# Patient Record
Sex: MALE | Race: OTHER | HISPANIC OR LATINO
[De-identification: names, ages, dates, MRNs, and addresses within clinical notes are randomized per-mention and may not be internally consistent; named-entity substitution may affect disease eponyms.]

---

## 2019-01-30 PROBLEM — Z00.00 ENCOUNTER FOR PREVENTIVE HEALTH EXAMINATION: Status: ACTIVE | Noted: 2019-01-30

## 2019-02-27 ENCOUNTER — APPOINTMENT (OUTPATIENT)
Dept: ORTHOPEDIC SURGERY | Facility: CLINIC | Age: 59
End: 2019-02-27
Payer: COMMERCIAL

## 2019-02-27 VITALS
SYSTOLIC BLOOD PRESSURE: 168 MMHG | BODY MASS INDEX: 33.65 KG/M2 | HEIGHT: 68 IN | TEMPERATURE: 98.1 F | HEART RATE: 68 BPM | WEIGHT: 222 LBS | DIASTOLIC BLOOD PRESSURE: 97 MMHG

## 2019-02-27 PROCEDURE — 73564 X-RAY EXAM KNEE 4 OR MORE: CPT | Mod: LT

## 2019-02-27 PROCEDURE — 20610 DRAIN/INJ JOINT/BURSA W/O US: CPT | Mod: LT

## 2019-02-27 PROCEDURE — 99204 OFFICE O/P NEW MOD 45 MIN: CPT | Mod: 25

## 2019-02-27 RX ORDER — METHYLPRED ACET/NACL,ISO-OS/PF 40 MG/ML
40 VIAL (ML) INJECTION
Qty: 1 | Refills: 0 | Status: ACTIVE | COMMUNITY
Start: 2019-02-27

## 2019-02-27 RX ORDER — METHOCARBAMOL 750 MG/1
750 TABLET, FILM COATED ORAL
Qty: 20 | Refills: 0 | Status: ACTIVE | COMMUNITY
Start: 2018-11-16

## 2019-02-27 RX ORDER — IBUPROFEN 600 MG/1
600 TABLET, FILM COATED ORAL
Qty: 20 | Refills: 0 | Status: ACTIVE | COMMUNITY
Start: 2018-11-16

## 2019-02-27 NOTE — PROCEDURE
[de-identified] : The bilateral knees were prepped with Betadine and under sterile condition the 40 mg Depo-Medrol and then 5 cc Lidocaine and 20 cc Marcaine injection was performed with a 21 gauge needle. The needle was introduced into the joint, aspiration was performed to ensure intra-articular placement and the medication was injected. Upon withdrawal of the needle the site was cleaned with alcohol and a band aid applied. The patient tolerated the injection well and there were no adverse effects. Post injection instructions included no strenuous activity for 24 hours, cryotherapy and if there are any adverse effects to contact the office.

## 2019-02-27 NOTE — ADDENDUM
[FreeTextEntry1] : I, Kirby Owens, acted solely as a scribe for Dr. Jacob Schulte on 02/27/2019.\par \par All medical record entries made by the scribe were at my, Dr. Jacob Schulte, direction and personally dictated by me on 02/27/2019. I have reviewed the chart and agree that the record accurately reflects my personal performance of the history, physical exam, assessment and plan. I have also personally directed, reviewed, and agreed with the chart.

## 2019-02-27 NOTE — DISCUSSION/SUMMARY
[de-identified] : 59 year old male presents with severe bilateral knee DJD. He has essentially not had conservative treatment thus far. Discussed at length the natural history of degenerative arthritis and reviewed non-operative and operative treatment. At this point I suggested physiotherapy, NSAIDs and/or Tylenol, and corticosteroid injections. A prescription for Physical Therapy was provided. He elected to receive an injection today. We talked about an injection. Discussed at length with the patient the planned steroid and lidocaine injection. The risks, benefits, convalescence and alternatives were reviewed. The possible side effects discussed included but were not limited to: pain, swelling, heat and redness. There symptoms are generally mild but if they are extensive then contact the office. Giving pain relievers by mouth such as NSAIDs or Tylenol can generally treat the reactions to steroid and lidocaine. Rare cases of infection have been noted. Rash, hives and itching may occur post injection. If you have muscle pain or cramps, flushing and or swelling of the face, rapid heartbeat, nausea, dizziness, fever, chills, headache, difficulty breathing, swelling in the arms or legs, or have a prickly feeling of your skin, contact a health care provider immediately. If he fails to improve with conservative treatment, will consider knee replacement. FU in 6 weeks with Dr. Groves.

## 2019-02-27 NOTE — HISTORY OF PRESENT ILLNESS
[Pain Location] : pain [de-identified] : Patient is a 59 year old male who presents c/o left knee pain x years. patient states pain started after soccer injury, had SA 8 years ago. pain increased 2 years ago. patient states pain is a diffuse ache, no radiation of pain. positive buckling, clicking and swelling, no locking.  patient had PT after SA. patient c/o mild right knee pain as well

## 2019-02-27 NOTE — PHYSICAL EXAM
[Normal] : Gait: normal [LE] : Sensory: Intact in bilateral lower extremities [DP] : dorsalis pedis 2+ and symmetric bilaterally [PT] : posterior tibial 2+ and symmetric bilaterally [Obese] : obese [Poor Appearance] : well-appearing [Acute Distress] : not in acute distress [de-identified] : General appearance: Well nourished, well developed, pleasant, alert, and oriented x3.\par Respiratory: Breathing not labored, in no acute distress.\par HEENT: Normocephalic. EOM intact. Sclerae are clear.\par CV: No apparent abnormalities. No lower leg edema. No varicosities. Pedal pulses are palpable.\par Neurologic: Sensation is intact to light touch in the upper and lower extremities. No muscle weakness.\par Dermatologic: No apparent skin lesions or rash.\par Spine: C spine and L spine appear normal and move freely, normal and nontender.\par Upper Extremities: Hands, wrists, and elbows are normal and move freely. Shoulders are normal and move freely. All range of motion is symmetrical.\par Normal body habitus. Pulses are palpable.\par Review of systems, please see form for complete details. Medical data sheet was reviewed.\par \par Left knee, FROM hip, mild effusion, 0 - 120, no crepitus, no medial pain, no lateral pain, no Lachman, no pivot shift, no anterior drawer, no posterior drawer, stable, varus alignment- greater than right, significant bilateral bowing noted\par Right knee, FROM hip, mild effusion, 0 - 120, large crepitus, no medial pain, no lateral pain, no Lachman, no pivot shift, no anterior drawer, no posterior drawer, stable, varus alignment, significant bilateral bowing noted\par   [de-identified] : Xrays, taken at the office today show:\par \par Right Knee xrays:\par Standing AP, Lateral, and Merchant films:\par DJD, severe Grade 4 changes present, Sclerosis present, Osteophytes seen, varus alignment, severe patellofemoral arthritis \par \par Left Knee xrays:\par Standing AP, Lateral, and Merchant films:\par DJD, severe Grade 4 changes present, Sclerosis present, Osteophytes seen, varus alignment, severe patellofemoral arthritis

## 2019-05-01 DIAGNOSIS — I10 ESSENTIAL (PRIMARY) HYPERTENSION: ICD-10-CM

## 2019-05-01 RX ORDER — METFORMIN ER 500 MG 500 MG/1
500 TABLET ORAL
Refills: 0 | Status: ACTIVE | COMMUNITY
Start: 2019-01-18

## 2019-05-01 RX ORDER — LOSARTAN POTASSIUM 50 MG/1
50 TABLET, FILM COATED ORAL
Refills: 0 | Status: ACTIVE | COMMUNITY
Start: 2019-01-18

## 2019-05-02 ENCOUNTER — APPOINTMENT (OUTPATIENT)
Dept: CARDIOLOGY | Facility: CLINIC | Age: 59
End: 2019-05-02

## 2019-05-20 ENCOUNTER — APPOINTMENT (OUTPATIENT)
Dept: ORTHOPEDIC SURGERY | Facility: CLINIC | Age: 59
End: 2019-05-20

## 2021-05-24 ENCOUNTER — APPOINTMENT (OUTPATIENT)
Dept: ORTHOPEDIC SURGERY | Facility: CLINIC | Age: 61
End: 2021-05-24
Payer: COMMERCIAL

## 2021-05-24 VITALS
HEIGHT: 69 IN | SYSTOLIC BLOOD PRESSURE: 160 MMHG | TEMPERATURE: 97.9 F | DIASTOLIC BLOOD PRESSURE: 94 MMHG | BODY MASS INDEX: 33.03 KG/M2 | HEART RATE: 64 BPM | WEIGHT: 223 LBS

## 2021-05-24 DIAGNOSIS — Z83.3 FAMILY HISTORY OF DIABETES MELLITUS: ICD-10-CM

## 2021-05-24 DIAGNOSIS — M21.162 VARUS DEFORMITY, NOT ELSEWHERE CLASSIFIED, LEFT KNEE: ICD-10-CM

## 2021-05-24 DIAGNOSIS — M25.562 PAIN IN RIGHT KNEE: ICD-10-CM

## 2021-05-24 DIAGNOSIS — M25.561 PAIN IN RIGHT KNEE: ICD-10-CM

## 2021-05-24 DIAGNOSIS — Z86.39 PERSONAL HISTORY OF OTHER ENDOCRINE, NUTRITIONAL AND METABOLIC DISEASE: ICD-10-CM

## 2021-05-24 DIAGNOSIS — M17.0 BILATERAL PRIMARY OSTEOARTHRITIS OF KNEE: ICD-10-CM

## 2021-05-24 PROCEDURE — 99072 ADDL SUPL MATRL&STAF TM PHE: CPT

## 2021-05-24 PROCEDURE — 73562 X-RAY EXAM OF KNEE 3: CPT | Mod: RT

## 2021-05-24 PROCEDURE — 99204 OFFICE O/P NEW MOD 45 MIN: CPT

## 2021-05-24 NOTE — END OF VISIT
[FreeTextEntry3] : I, Ranjith Fuller, acted solely as a scribe for Dr. Joshua Groves on this date 05/24/2021.

## 2021-05-24 NOTE — HISTORY OF PRESENT ILLNESS
[Pain Location] : pain [6] : a current pain level of 6/10 [3] : a minimum pain level of 3/10 [8] : a maximum pain level of 8/10 [Walking] : worsened by walking [Rest] : relieved by rest [de-identified] : 62 y/o M presents with b/l knee pain where the left knee pain is worse than the right knee pain. This pain has been present for a couple of years. HIs pain is in the medial aspect of his knees. The pt sometimes kneels while at work which causes his pain. He has no hip pain. His knee howard and clicking. He denies locking. He received cortisone injections from Dr. Schulte in 2019. He describes his pain as an ache.

## 2021-05-24 NOTE — REASON FOR VISIT
[Initial Visit] : an initial visit for [Knee Pain] : knee pain [FreeTextEntry1] : [id number] [FreeTextEntry2] : Anatoly [TWNoteComboBox1] : [place]

## 2021-05-24 NOTE — DISCUSSION/SUMMARY
[Surgical risks reviewed] : Surgical risks reviewed [de-identified] : 62 y/o M with bone on bone medial compartmental osteoarthritis with marked varus deformity (L>R) of his bilateral knees. Conservative therapy and surgical options discussed in detail with the patient. The patient is a candidate for a staged bilateral TKA. He is experiencing more pain in the left knee than in the right knee. As a result, he would like to have the left TKA first--particularly at the end of November or beginning of December. We discussed pre-op, surgery, and post-op in detail. He is aware and understands the risk of infections. He scheduled the left TKA today. \par \par The patient is a 61 year year individual with end stage arthritis of their b/l knee joint. Based upon the patient's continued symptoms and failure to respond to conservative treatment I have recommended a left total knee arthroplasty for this patient. A long discussion took place with the patient describing what a total joint replacement is and what the procedure would entail. A total knee arthroplasty model, similar to the implant that was used during the operation, was utilized to demonstrate and to discuss the various bearing surfaces of the implants. The hospitalization and post-operative care and rehabilitation were also discussed. The use of perioperative antibiotics and DVT prophylaxis were discussed. The risk, benefits and alternatives to a surgical intervention were discussed at length with the patient. The patient was also advised of risks related to the medical comorbidities, elevated body mass index (BMI), and smoking where applicable. We discussed how to reduce modifiable risk factors and encouraged smoking cessation were applicable.. A lengthy discussion took place to review the most common complications including but not limited to: deep vein thrombosis, pulmonary embolus, heart attack, stroke, infection, wound breakdown, numbness, damage to nerves, tendon, muscles, arteries or other blood vessels, death and other possible complications from anesthesia. The patient was told that we will take steps to minimize these risks by using sterile technique, antibiotics and DVT prophylaxis when appropriate and follow the patient postoperatively in the office setting to monitor progress. The possibility of recurrent pain, no improvement in pain and actual worsening of pain were also discussed with the patient.\par The discharge plan of care focused on the patient going home following surgery. The patient was encouraged to make the necessary arrangements to have someone stay with them when they are discharged home. Following discharge, a home care nurse was to the patient. The home care nurse would open the patient’s home care case and request home physical therapy services. Home physical therapy was to commence following discharge provided it was appropriate and covered by the health insurance benefit plan. \par The benefits of surgery were discussed with the patient including the potential for improving her current clinical condition through operative intervention. Alternatives to surgical intervention including continued conservative management were also discussed in detail. All questions were answered to the satisfaction of the patient. The treatment plan of care, as well as a model of a total knee arthroplasty equivalent to the one that will be used for their total joint replacement, was shared with the patient. The patient agreed to the plan of care as well as the use of implants in their total joint replacement.

## 2021-05-24 NOTE — PHYSICAL EXAM
[LE] : Sensory: Intact in bilateral lower extremities [ALL] : dorsalis pedis, posterior tibial, femoral, popliteal, and radial 2+ and symmetric bilaterally [Normal] : Alert and in no acute distress [Poor Appearance] : well-appearing [de-identified] : GENERAL APPEARANCE: Well nourished and hydrated, pleasant, alert, and oriented x 3. Appears their stated age. \par HEENT: Normocephalic, extraocular eye motion intact. Nasal septum midline. Oral cavity clear. External auditory canal clear. \par RESPIRATORY: Breath sounds clear and audible in all lobes. No wheezing, No accessory muscle use.\par CARDIOVASCULAR: No apparent abnormalities. No lower leg edema. No varicosities. Pedal pulses are palpable.\par NEUROLOGIC: Sensation is normal, no muscle weakness in the upper or lower extremities.\par DERMATOLOGIC: No apparent skin lesions, moist, warm, no rash.\par SPINE: Cervical spine appears normal and moves freely; thoracic spine appears normal and moves freely; lumbosacral spine appears normal and moves freely, normal, nontender.\par MUSCULOSKELETAL: Hands, wrists, and elbows are normal and move freely, shoulders are normal and move freely.  [de-identified] : Bilateral knee exam shows medial joint line tenderness, marked varus deformity (L>R)\par Left knee exam shows ROM 0-150 degrees\par Right knee exam shows ROM 0-115 degrees [de-identified] : 5V xray of the b/l knee done in the office today and reviewed by Dr. Joshua Groves demonstrates bone on bone medial compartmental osteoarthritis with marked varus deformity

## 2021-05-24 NOTE — REVIEW OF SYSTEMS
[Joint Pain] : joint pain [Joint Stiffness] : joint stiffness [Negative] : Heme/Lymph [FreeTextEntry9] : b/l knee (L>R)

## 2021-08-06 DIAGNOSIS — Z01.818 ENCOUNTER FOR OTHER PREPROCEDURAL EXAMINATION: ICD-10-CM

## 2021-10-05 ENCOUNTER — NON-APPOINTMENT (OUTPATIENT)
Age: 61
End: 2021-10-05

## 2021-10-05 ENCOUNTER — APPOINTMENT (OUTPATIENT)
Dept: ORTHOPEDIC SURGERY | Facility: CLINIC | Age: 61
End: 2021-10-05
Payer: COMMERCIAL

## 2021-10-05 VITALS
HEIGHT: 69 IN | BODY MASS INDEX: 33.03 KG/M2 | SYSTOLIC BLOOD PRESSURE: 159 MMHG | HEART RATE: 61 BPM | DIASTOLIC BLOOD PRESSURE: 87 MMHG | WEIGHT: 223 LBS

## 2021-10-05 DIAGNOSIS — S63.501A UNSPECIFIED SPRAIN OF RIGHT WRIST, INITIAL ENCOUNTER: ICD-10-CM

## 2021-10-05 PROCEDURE — 99203 OFFICE O/P NEW LOW 30 MIN: CPT

## 2021-10-05 PROCEDURE — 73110 X-RAY EXAM OF WRIST: CPT | Mod: RT

## 2021-10-05 NOTE — ASSESSMENT
[FreeTextEntry1] : 7-year-old male presents with right wrist pain status post work-related injury one week ago, exam findings consistent with right wrist sprain\par the patient was informed of the findings and recommended for conservative treatment inclusive of wrist immobilization as needed for activity along with use of anti-inflammatories He will continue home exercise to improve mobility in followup in 4 weeks for reevaluation.

## 2021-10-05 NOTE — HISTORY OF PRESENT ILLNESS
[FreeTextEntry1] : 61-year-old male presents for evaluation of right wrist pain status post an injury while working on 9/30. He was using a shovel he hit something hard and the ground causing severe pain and slight hyperextension of his right wrist. He was seen in the emergency room that day where x-rays were taken revealing no acute osseus fracture. He is placed into a wrist brace at that time it has been wearing it at all times other than hygiene. He reports mild pain localized to the dorsum of the wrist with any motion or lifting activity. He has been taking over-the-counter medication for pain.

## 2021-10-05 NOTE — PHYSICAL EXAM
[de-identified] : right wrist exam\par \par Skin: Clean, dry, intact. mild swelling localized to the dorsum of the wrist, no ecchymosis, no gross deformity.\par range of motion of the right wrist slightly reduced secondary to pain/stiffness. normal range of motion of the left wrist without pain\par Tenderness: No tenderness to palpation at the distal radius, distal ulna, the DRUJ. No pain at the scapholunate interval. No snuffbox pain.\par Strength: 5/5 wrist flexion, 5/5 wrist extension, 5/5 supination, 5/5 pronation\par Stability: Stable DRUJ \par Vasc: 2+ radial pulse, <2s cap refill\par Sensation: In tact to light touch throughout\par Neuro: Negative tinels over median nerve, AIN/PIN/Ulnar nerve in tact to motor/sensation.\par  [de-identified] : 3 x-rays of the right wrist taken today reveal no acute fracture or osseous abnormality.

## 2021-12-13 ENCOUNTER — OUTPATIENT (OUTPATIENT)
Dept: OUTPATIENT SERVICES | Facility: HOSPITAL | Age: 61
LOS: 1 days | End: 2021-12-13
Payer: COMMERCIAL

## 2021-12-13 VITALS
HEIGHT: 69 IN | WEIGHT: 222.01 LBS | SYSTOLIC BLOOD PRESSURE: 157 MMHG | TEMPERATURE: 99 F | OXYGEN SATURATION: 98 % | HEART RATE: 65 BPM | DIASTOLIC BLOOD PRESSURE: 91 MMHG | RESPIRATION RATE: 20 BRPM

## 2021-12-13 DIAGNOSIS — M17.12 UNILATERAL PRIMARY OSTEOARTHRITIS, LEFT KNEE: ICD-10-CM

## 2021-12-13 DIAGNOSIS — Z98.890 OTHER SPECIFIED POSTPROCEDURAL STATES: Chronic | ICD-10-CM

## 2021-12-13 DIAGNOSIS — I10 ESSENTIAL (PRIMARY) HYPERTENSION: ICD-10-CM

## 2021-12-13 DIAGNOSIS — Z29.9 ENCOUNTER FOR PROPHYLACTIC MEASURES, UNSPECIFIED: ICD-10-CM

## 2021-12-13 DIAGNOSIS — Z01.818 ENCOUNTER FOR OTHER PREPROCEDURAL EXAMINATION: ICD-10-CM

## 2021-12-13 LAB
A1C WITH ESTIMATED AVERAGE GLUCOSE RESULT: 6.1 % — HIGH (ref 4–5.6)
ALBUMIN SERPL ELPH-MCNC: 4.4 G/DL — SIGNIFICANT CHANGE UP (ref 3.3–5.2)
ALP SERPL-CCNC: 86 U/L — SIGNIFICANT CHANGE UP (ref 40–120)
ALT FLD-CCNC: 33 U/L — SIGNIFICANT CHANGE UP
ANION GAP SERPL CALC-SCNC: 13 MMOL/L — SIGNIFICANT CHANGE UP (ref 5–17)
APTT BLD: 29.4 SEC — SIGNIFICANT CHANGE UP (ref 27.5–35.5)
AST SERPL-CCNC: 35 U/L — SIGNIFICANT CHANGE UP
BASOPHILS # BLD AUTO: 0.03 K/UL — SIGNIFICANT CHANGE UP (ref 0–0.2)
BASOPHILS NFR BLD AUTO: 0.4 % — SIGNIFICANT CHANGE UP (ref 0–2)
BILIRUB SERPL-MCNC: 0.4 MG/DL — SIGNIFICANT CHANGE UP (ref 0.4–2)
BUN SERPL-MCNC: 16.1 MG/DL — SIGNIFICANT CHANGE UP (ref 8–20)
CALCIUM SERPL-MCNC: 10.6 MG/DL — HIGH (ref 8.6–10.2)
CHLORIDE SERPL-SCNC: 103 MMOL/L — SIGNIFICANT CHANGE UP (ref 98–107)
CO2 SERPL-SCNC: 24 MMOL/L — SIGNIFICANT CHANGE UP (ref 22–29)
CREAT SERPL-MCNC: 1 MG/DL — SIGNIFICANT CHANGE UP (ref 0.5–1.3)
EOSINOPHIL # BLD AUTO: 0.03 K/UL — SIGNIFICANT CHANGE UP (ref 0–0.5)
EOSINOPHIL NFR BLD AUTO: 0.4 % — SIGNIFICANT CHANGE UP (ref 0–6)
ESTIMATED AVERAGE GLUCOSE: 128 MG/DL — HIGH (ref 68–114)
GLUCOSE SERPL-MCNC: 98 MG/DL — SIGNIFICANT CHANGE UP (ref 70–99)
HCT VFR BLD CALC: 42.2 % — SIGNIFICANT CHANGE UP (ref 39–50)
HGB BLD-MCNC: 13.7 G/DL — SIGNIFICANT CHANGE UP (ref 13–17)
IMM GRANULOCYTES NFR BLD AUTO: 0.4 % — SIGNIFICANT CHANGE UP (ref 0–1.5)
INR BLD: 1.05 RATIO — SIGNIFICANT CHANGE UP (ref 0.88–1.16)
LYMPHOCYTES # BLD AUTO: 1.67 K/UL — SIGNIFICANT CHANGE UP (ref 1–3.3)
LYMPHOCYTES # BLD AUTO: 20 % — SIGNIFICANT CHANGE UP (ref 13–44)
MCHC RBC-ENTMCNC: 30.2 PG — SIGNIFICANT CHANGE UP (ref 27–34)
MCHC RBC-ENTMCNC: 32.5 GM/DL — SIGNIFICANT CHANGE UP (ref 32–36)
MCV RBC AUTO: 93.2 FL — SIGNIFICANT CHANGE UP (ref 80–100)
MONOCYTES # BLD AUTO: 0.83 K/UL — SIGNIFICANT CHANGE UP (ref 0–0.9)
MONOCYTES NFR BLD AUTO: 10 % — SIGNIFICANT CHANGE UP (ref 2–14)
MRSA PCR RESULT.: SIGNIFICANT CHANGE UP
NEUTROPHILS # BLD AUTO: 5.74 K/UL — SIGNIFICANT CHANGE UP (ref 1.8–7.4)
NEUTROPHILS NFR BLD AUTO: 68.8 % — SIGNIFICANT CHANGE UP (ref 43–77)
PLATELET # BLD AUTO: 213 K/UL — SIGNIFICANT CHANGE UP (ref 150–400)
POTASSIUM SERPL-MCNC: 4.1 MMOL/L — SIGNIFICANT CHANGE UP (ref 3.5–5.3)
POTASSIUM SERPL-SCNC: 4.1 MMOL/L — SIGNIFICANT CHANGE UP (ref 3.5–5.3)
PROT SERPL-MCNC: 7.3 G/DL — SIGNIFICANT CHANGE UP (ref 6.6–8.7)
PROTHROM AB SERPL-ACNC: 12.2 SEC — SIGNIFICANT CHANGE UP (ref 10.6–13.6)
RBC # BLD: 4.53 M/UL — SIGNIFICANT CHANGE UP (ref 4.2–5.8)
RBC # FLD: 13.5 % — SIGNIFICANT CHANGE UP (ref 10.3–14.5)
S AUREUS DNA NOSE QL NAA+PROBE: SIGNIFICANT CHANGE UP
SODIUM SERPL-SCNC: 140 MMOL/L — SIGNIFICANT CHANGE UP (ref 135–145)
WBC # BLD: 8.33 K/UL — SIGNIFICANT CHANGE UP (ref 3.8–10.5)
WBC # FLD AUTO: 8.33 K/UL — SIGNIFICANT CHANGE UP (ref 3.8–10.5)

## 2021-12-13 PROCEDURE — 93010 ELECTROCARDIOGRAM REPORT: CPT

## 2021-12-13 PROCEDURE — 93005 ELECTROCARDIOGRAM TRACING: CPT

## 2021-12-13 NOTE — H&P PST ADULT - ASSESSMENT
60 yo M PMH of HTN, OA, presents with b/l aching knee pain, left knee pain is worse than right knee pain. He reports that the pain has been present for a couple of years. His pain is in the medial aspect of his knees. He sometimes kneels while at work which causes more pain. His knee howard and clicks. He denies locking. He received cortisone injections in 2019. Pain levels include a current pain level of 6/10, a minimum pain level of 3/10 and a maximum pain level of 8/10. Modifying factors: worsened by walking. Relieving factors include rest. Preop assessment prior to left TKR w/Dr Grovse scheduled for 2021    Pt was educated on preop preparation with written and verbal instructions. Pt was informed to obtain clearances >3 days before surgery. Pt will review medications with PCP. Pt was educated on NSAIDs, multivitamins and herbals that increase the risk of bleeding and need to be stopped 7 days before procedure. Pt was educated on covid testing and covid prevention, i.e. social distancing, handwashing, mask wearing. Pt verbalized understanding of the above.     OPIOID RISK TOOL    DANTE EACH BOX THAT APPLIES AND ADD TOTALS AT THE END    FAMILY HISTORY OF SUBSTANCE ABUSE                 FEMALE         MALE                                                Alcohol                             [  ]1 pt          [  ]3pts                                               Illegal Durgs                     [  ]2 pts        [  ]3pts                                               Rx Drugs                           [  ]4 pts        [  ]4 pts    PERSONAL HISTORY OF SUBSTANCE ABUSE                                                                                          Alcohol                             [  ]3 pts       [  ]3 pts                                               Illegal Drugs                     [  ]4 pts        [  ]4 pts                                               Rx Drugs                           [  ]5 pts        [  ]5 pts    AGE BETWEEN 16-45 YEARS                                      [  ]1 pt         [  ]1 pt    HISTORY OF PREADOLESCENT   SEXUAL ABUSE                                                             [  ]3 pts        [  ]0pts    PSYCHOLOGICAL DISEASE                     ADD, OCD, Bipolar, Schizophrenia        [  ]2 pts         [  ]2 pts                      Depression                                               [  ]1 pt           [  ]1 pt           SCORING TOTAL   (add numbers and type here)              ( 0 )                                     A score of 3 or lower indicated LOW risk for future opioid abuse  A score of 4 to 7 indicated moderate risk for future opioid abuse  A score of 8 or higher indicates a high risk for opioid abuse    CAPRINI VTE 2.0 SCORE [CLOT updated 2019]    AGE RELATED RISK FACTORS                                                       MOBILITY RELATED FACTORS  [x ] Age 41-60 years                                            (1 Point)                    [ ] Bed rest                                                        (1 Point)  [ ] Age: 61-74 years                                           (2 Points)                  [ ] Plaster cast                                                   (2 Points)  [ ] Age= 75 years                                              (3 Points)                    [ ] Bed bound for more than 72 hours                 (2 Points)    DISEASE RELATED RISK FACTORS                                               GENDER SPECIFIC FACTORS  [ ] Edema in the lower extremities                       (1 Point)              [ ] Pregnancy                                                     (1 Point)  [ ] Varicose veins                                               (1 Point)                     [ ] Post-partum < 6 weeks                                   (1 Point)             [x ] BMI > 25 Kg/m2                                            (1 Point)                     [ ] Hormonal therapy  or oral contraception          (1 Point)                 [ ] Sepsis (in the previous month)                        (1 Point)               [ ] History of pregnancy complications                 (1 point)  [ ] Pneumonia or serious lung disease                                               [ ] Unexplained or recurrent                     (1 Point)           (in the previous month)                               (1 Point)  [ ] Abnormal pulmonary function test                     (1 Point)                 SURGERY RELATED RISK FACTORS  [ ] Acute myocardial infarction                              (1 Point)               [ ]  Section                                             (1 Point)  [ ] Congestive heart failure (in the previous month)  (1 Point)      [ ] Minor surgery                                                  (1 Point)   [ ] Inflammatory bowel disease                             (1 Point)               [ ] Arthroscopic surgery                                        (2 Points)  [ ] Central venous access                                      (2 Points)                [ ] General surgery lasting more than 45 minutes (2 points)  [ ] Malignancy- Present or previous                   (2 Points)                [x ] Elective arthroplasty                                         (5 points)    [ ] Stroke (in the previous month)                          (5 Points)                                                                                                                                                           HEMATOLOGY RELATED FACTORS                                                 TRAUMA RELATED RISK FACTORS  [ ] Prior episodes of VTE                                     (3 Points)                [ ] Fracture of the hip, pelvis, or leg                       (5 Points)  [ ] Positive family history for VTE                         (3 Points)             [ ] Acute spinal cord injury (in the previous month)  (5 Points)  [ ] Prothrombin 01926 A                                     (3 Points)               [ ] Paralysis  (less than 1 month)                             (5 Points)  [ ] Factor V Leiden                                             (3 Points)                  [ ] Multiple Trauma within 1 month                        (5 Points)  [ ] Lupus anticoagulants                                     (3 Points)                                                           [ ] Anticardiolipin antibodies                               (3 Points)                                                       [ ] High homocysteine in the blood                      (3 Points)                                             [ ] Other congenital or acquired thrombophilia      (3 Points)                                                [ ] Heparin induced thrombocytopenia                  (3 Points)                                     Total Score [          ] 62 yo M PMH of HTN, DM2, OA, presents with bilateral aching knee pain, left knee pain is worse than right knee pain. He reports that the pain has been present for a couple of years. His pain is in the medial aspect of his knees. He sometimes kneels while at work which causes more pain. His left knee howard and clicks. He denies locking. He received cortisone injections in 2019. Pain levels include a current pain level of 6/10, a minimum pain level of 3/10 and a maximum pain level of 8/10. Modifying factors: worsened by walking. Relieving factors include rest. Preop assessment prior to left TKR w/Dr Groves scheduled for 2021    Pt was educated on preop preparation with written and verbal instructions. Pt was informed to obtain clearances >3 days before surgery. Pt will review medications with PCP. Pt was educated on NSAIDs, multivitamins and herbals that increase the risk of bleeding and need to be stopped 7 days before procedure. Pt was educated on covid testing and covid prevention, i.e. social distancing, handwashing, mask wearing. Pt verbalized understanding of the above.     OPIOID RISK TOOL    DANTE EACH BOX THAT APPLIES AND ADD TOTALS AT THE END    FAMILY HISTORY OF SUBSTANCE ABUSE                 FEMALE         MALE                                                Alcohol                             [  ]1 pt          [  ]3pts                                               Illegal Durgs                     [  ]2 pts        [  ]3pts                                               Rx Drugs                           [  ]4 pts        [  ]4 pts    PERSONAL HISTORY OF SUBSTANCE ABUSE                                                                                          Alcohol                             [  ]3 pts       [  ]3 pts                                               Illegal Drugs                     [  ]4 pts        [  ]4 pts                                               Rx Drugs                           [  ]5 pts        [  ]5 pts    AGE BETWEEN 16-45 YEARS                                      [  ]1 pt         [  ]1 pt    HISTORY OF PREADOLESCENT   SEXUAL ABUSE                                                             [  ]3 pts        [  ]0pts    PSYCHOLOGICAL DISEASE                     ADD, OCD, Bipolar, Schizophrenia        [  ]2 pts         [  ]2 pts                      Depression                                               [  ]1 pt           [  ]1 pt           SCORING TOTAL   (add numbers and type here)              ( 0 )                                     A score of 3 or lower indicated LOW risk for future opioid abuse  A score of 4 to 7 indicated moderate risk for future opioid abuse  A score of 8 or higher indicates a high risk for opioid abuse    CAPRINI VTE 2.0 SCORE [CLOT updated 2019]    AGE RELATED RISK FACTORS                                                       MOBILITY RELATED FACTORS  [x ] Age 41-60 years                                            (1 Point)                    [ ] Bed rest                                                        (1 Point)  [ ] Age: 61-74 years                                           (2 Points)                  [ ] Plaster cast                                                   (2 Points)  [ ] Age= 75 years                                              (3 Points)                    [ ] Bed bound for more than 72 hours                 (2 Points)    DISEASE RELATED RISK FACTORS                                               GENDER SPECIFIC FACTORS  [ ] Edema in the lower extremities                       (1 Point)              [ ] Pregnancy                                                     (1 Point)  [ ] Varicose veins                                               (1 Point)                     [ ] Post-partum < 6 weeks                                   (1 Point)             [x ] BMI > 25 Kg/m2                                            (1 Point)                     [ ] Hormonal therapy  or oral contraception          (1 Point)                 [ ] Sepsis (in the previous month)                        (1 Point)               [ ] History of pregnancy complications                 (1 point)  [ ] Pneumonia or serious lung disease                                               [ ] Unexplained or recurrent                     (1 Point)           (in the previous month)                               (1 Point)  [ ] Abnormal pulmonary function test                     (1 Point)                 SURGERY RELATED RISK FACTORS  [ ] Acute myocardial infarction                              (1 Point)               [ ]  Section                                             (1 Point)  [ ] Congestive heart failure (in the previous month)  (1 Point)      [ ] Minor surgery                                                  (1 Point)   [ ] Inflammatory bowel disease                             (1 Point)               [ ] Arthroscopic surgery                                        (2 Points)  [ ] Central venous access                                      (2 Points)                [ ] General surgery lasting more than 45 minutes (2 points)  [ ] Malignancy- Present or previous                   (2 Points)                [x ] Elective arthroplasty                                         (5 points)    [ ] Stroke (in the previous month)                          (5 Points)                                                                                                                                                           HEMATOLOGY RELATED FACTORS                                                 TRAUMA RELATED RISK FACTORS  [ ] Prior episodes of VTE                                     (3 Points)                [ ] Fracture of the hip, pelvis, or leg                       (5 Points)  [ ] Positive family history for VTE                         (3 Points)             [ ] Acute spinal cord injury (in the previous month)  (5 Points)  [ ] Prothrombin 65552 A                                     (3 Points)               [ ] Paralysis  (less than 1 month)                             (5 Points)  [ ] Factor V Leiden                                             (3 Points)                  [ ] Multiple Trauma within 1 month                        (5 Points)  [ ] Lupus anticoagulants                                     (3 Points)                                                           [ ] Anticardiolipin antibodies                               (3 Points)                                                       [ ] High homocysteine in the blood                      (3 Points)                                             [ ] Other congenital or acquired thrombophilia      (3 Points)                                                [ ] Heparin induced thrombocytopenia                  (3 Points)                                     Total Score [          ]

## 2021-12-13 NOTE — H&P PST ADULT - LYMPH NODES
Attempted to contact the patients daughter but the voicemail was full. Recommendation is for Shalini to be referred to Dr. Rios for evaluation.   No lymphadedenopathy

## 2021-12-13 NOTE — PATIENT PROFILE ADULT - FUNCTIONAL SCREEN CURRENT LEVEL: COMMUNICATION, MLM
VASCULAR SURGERY TREATMENT PLAN    - S/p Right arm AVG placement in OR  - OK to use graft tomorrow for HD with SMALL needles  - Can remove bandages in 48 hrs and shower   - Call vascular surgery with questions regarding graft       Rodrigue Echeverria M.D.  General Surgery PGY3  [phone]   
0 = understands/communicates without difficulty

## 2021-12-13 NOTE — H&P PST ADULT - NEGATIVE NEUROLOGICAL SYMPTOMS
no transient paralysis/no weakness/no paresthesias/no generalized seizures/no focal seizures/no syncope/no tremors/no vertigo/no loss of sensation/no headache/no loss of consciousness/no confusion

## 2021-12-13 NOTE — H&P PST ADULT - HISTORY OF PRESENT ILLNESS
62 yo M PMH of HTN, OA, presents with b/l aching knee pain, left knee pain is worse than right knee pain. He reports that the pain has been present for a couple of years. His pain is in the medial aspect of his knees. He sometimes kneels while at work which causes more pain. His knee howard and clicks. He denies locking. He received cortisone injections in 2019. Pain levels include a current pain level of 6/10, a minimum pain level of 3/10 and a maximum pain level of 8/10. Modifying factors: worsened by walking. Relieving factors include rest. Preop assessment prior to left TKR w/Dr Groves scheduled for 12/17/2021       60 yo M PMH of HTN, DM2, OA, presents with bilateral aching knee pain, left knee pain is worse than right knee pain. He reports that the pain has been present for a couple of years. His pain is in the medial aspect of his knees. He sometimes kneels while at work which causes more pain. His left knee howard and clicks. He denies locking. He received cortisone injections in 2019. Pain levels include a current pain level of 6/10, a minimum pain level of 3/10 and a maximum pain level of 8/10. Modifying factors: worsened by walking. Relieving factors include rest. Preop assessment prior to left TKR w/Dr Groves scheduled for 12/17/2021

## 2021-12-16 ENCOUNTER — TRANSCRIPTION ENCOUNTER (OUTPATIENT)
Age: 61
End: 2021-12-16

## 2021-12-17 ENCOUNTER — APPOINTMENT (OUTPATIENT)
Dept: ORTHOPEDIC SURGERY | Facility: HOSPITAL | Age: 61
End: 2021-12-17

## 2021-12-17 ENCOUNTER — INPATIENT (INPATIENT)
Facility: HOSPITAL | Age: 61
LOS: 0 days | Discharge: HOME | DRG: 470 | End: 2021-12-18
Attending: ORTHOPAEDIC SURGERY | Admitting: ORTHOPAEDIC SURGERY
Payer: COMMERCIAL

## 2021-12-17 ENCOUNTER — TRANSCRIPTION ENCOUNTER (OUTPATIENT)
Age: 61
End: 2021-12-17

## 2021-12-17 VITALS
DIASTOLIC BLOOD PRESSURE: 71 MMHG | HEART RATE: 58 BPM | OXYGEN SATURATION: 96 % | RESPIRATION RATE: 16 BRPM | HEIGHT: 69 IN | TEMPERATURE: 98 F | WEIGHT: 222.01 LBS | SYSTOLIC BLOOD PRESSURE: 125 MMHG

## 2021-12-17 DIAGNOSIS — M17.12 UNILATERAL PRIMARY OSTEOARTHRITIS, LEFT KNEE: ICD-10-CM

## 2021-12-17 DIAGNOSIS — Z29.9 ENCOUNTER FOR PROPHYLACTIC MEASURES, UNSPECIFIED: ICD-10-CM

## 2021-12-17 DIAGNOSIS — E11.9 TYPE 2 DIABETES MELLITUS WITHOUT COMPLICATIONS: ICD-10-CM

## 2021-12-17 DIAGNOSIS — Z98.890 OTHER SPECIFIED POSTPROCEDURAL STATES: Chronic | ICD-10-CM

## 2021-12-17 DIAGNOSIS — I10 ESSENTIAL (PRIMARY) HYPERTENSION: ICD-10-CM

## 2021-12-17 LAB
ABO RH CONFIRMATION: SIGNIFICANT CHANGE UP
GLUCOSE BLDC GLUCOMTR-MCNC: 102 MG/DL — HIGH (ref 70–99)
GLUCOSE BLDC GLUCOMTR-MCNC: 110 MG/DL — HIGH (ref 70–99)
GLUCOSE BLDC GLUCOMTR-MCNC: 128 MG/DL — HIGH (ref 70–99)
GLUCOSE BLDC GLUCOMTR-MCNC: 81 MG/DL — SIGNIFICANT CHANGE UP (ref 70–99)
GLUCOSE BLDC GLUCOMTR-MCNC: 97 MG/DL — SIGNIFICANT CHANGE UP (ref 70–99)

## 2021-12-17 PROCEDURE — 20985 CPTR-ASST DIR MS PX: CPT

## 2021-12-17 PROCEDURE — 73560 X-RAY EXAM OF KNEE 1 OR 2: CPT | Mod: 26,LT

## 2021-12-17 PROCEDURE — 99222 1ST HOSP IP/OBS MODERATE 55: CPT

## 2021-12-17 PROCEDURE — 27447 TOTAL KNEE ARTHROPLASTY: CPT | Mod: LT

## 2021-12-17 PROCEDURE — 27447 TOTAL KNEE ARTHROPLASTY: CPT | Mod: AS,LT

## 2021-12-17 RX ORDER — ACETAMINOPHEN 500 MG
975 TABLET ORAL EVERY 8 HOURS
Refills: 0 | Status: DISCONTINUED | OUTPATIENT
Start: 2021-12-17 | End: 2021-12-18

## 2021-12-17 RX ORDER — SODIUM CHLORIDE 9 MG/ML
1000 INJECTION, SOLUTION INTRAVENOUS
Refills: 0 | Status: DISCONTINUED | OUTPATIENT
Start: 2021-12-17 | End: 2021-12-18

## 2021-12-17 RX ORDER — ASPIRIN/CALCIUM CARB/MAGNESIUM 324 MG
325 TABLET ORAL
Refills: 0 | Status: DISCONTINUED | OUTPATIENT
Start: 2021-12-17 | End: 2021-12-18

## 2021-12-17 RX ORDER — CEFAZOLIN SODIUM 1 G
2000 VIAL (EA) INJECTION ONCE
Refills: 0 | Status: DISCONTINUED | OUTPATIENT
Start: 2021-12-17 | End: 2021-12-17

## 2021-12-17 RX ORDER — SENNA PLUS 8.6 MG/1
2 TABLET ORAL AT BEDTIME
Refills: 0 | Status: DISCONTINUED | OUTPATIENT
Start: 2021-12-17 | End: 2021-12-18

## 2021-12-17 RX ORDER — OXYCODONE HYDROCHLORIDE 5 MG/1
5 TABLET ORAL
Refills: 0 | Status: DISCONTINUED | OUTPATIENT
Start: 2021-12-17 | End: 2021-12-18

## 2021-12-17 RX ORDER — PANTOPRAZOLE SODIUM 20 MG/1
40 TABLET, DELAYED RELEASE ORAL
Refills: 0 | Status: DISCONTINUED | OUTPATIENT
Start: 2021-12-17 | End: 2021-12-18

## 2021-12-17 RX ORDER — ACETAMINOPHEN 500 MG
975 TABLET ORAL ONCE
Refills: 0 | Status: DISCONTINUED | OUTPATIENT
Start: 2021-12-17 | End: 2021-12-17

## 2021-12-17 RX ORDER — AMLODIPINE BESYLATE 2.5 MG/1
1 TABLET ORAL
Qty: 0 | Refills: 0 | DISCHARGE

## 2021-12-17 RX ORDER — GLUCAGON INJECTION, SOLUTION 0.5 MG/.1ML
1 INJECTION, SOLUTION SUBCUTANEOUS ONCE
Refills: 0 | Status: DISCONTINUED | OUTPATIENT
Start: 2021-12-17 | End: 2021-12-18

## 2021-12-17 RX ORDER — SODIUM CHLORIDE 9 MG/ML
500 INJECTION INTRAMUSCULAR; INTRAVENOUS; SUBCUTANEOUS ONCE
Refills: 0 | Status: COMPLETED | OUTPATIENT
Start: 2021-12-17 | End: 2021-12-17

## 2021-12-17 RX ORDER — MAGNESIUM HYDROXIDE 400 MG/1
30 TABLET, CHEWABLE ORAL DAILY
Refills: 0 | Status: DISCONTINUED | OUTPATIENT
Start: 2021-12-17 | End: 2021-12-18

## 2021-12-17 RX ORDER — TRANEXAMIC ACID 100 MG/ML
1000 INJECTION, SOLUTION INTRAVENOUS ONCE
Refills: 0 | Status: DISCONTINUED | OUTPATIENT
Start: 2021-12-17 | End: 2021-12-17

## 2021-12-17 RX ORDER — CELECOXIB 200 MG/1
400 CAPSULE ORAL ONCE
Refills: 0 | Status: COMPLETED | OUTPATIENT
Start: 2021-12-17 | End: 2021-12-17

## 2021-12-17 RX ORDER — HYDROMORPHONE HYDROCHLORIDE 2 MG/ML
4 INJECTION INTRAMUSCULAR; INTRAVENOUS; SUBCUTANEOUS
Refills: 0 | Status: DISCONTINUED | OUTPATIENT
Start: 2021-12-17 | End: 2021-12-18

## 2021-12-17 RX ORDER — SODIUM CHLORIDE 9 MG/ML
1000 INJECTION, SOLUTION INTRAVENOUS
Refills: 0 | Status: DISCONTINUED | OUTPATIENT
Start: 2021-12-17 | End: 2021-12-17

## 2021-12-17 RX ORDER — ACETAMINOPHEN 500 MG
975 TABLET ORAL ONCE
Refills: 0 | Status: COMPLETED | OUTPATIENT
Start: 2021-12-17 | End: 2021-12-17

## 2021-12-17 RX ORDER — CEFAZOLIN SODIUM 1 G
2000 VIAL (EA) INJECTION
Refills: 0 | Status: COMPLETED | OUTPATIENT
Start: 2021-12-17 | End: 2021-12-18

## 2021-12-17 RX ORDER — OXYCODONE HYDROCHLORIDE 5 MG/1
10 TABLET ORAL
Refills: 0 | Status: DISCONTINUED | OUTPATIENT
Start: 2021-12-17 | End: 2021-12-18

## 2021-12-17 RX ORDER — HYDROMORPHONE HYDROCHLORIDE 2 MG/ML
0.5 INJECTION INTRAMUSCULAR; INTRAVENOUS; SUBCUTANEOUS
Refills: 0 | Status: DISCONTINUED | OUTPATIENT
Start: 2021-12-17 | End: 2021-12-18

## 2021-12-17 RX ORDER — SODIUM CHLORIDE 9 MG/ML
1000 INJECTION INTRAMUSCULAR; INTRAVENOUS; SUBCUTANEOUS
Refills: 0 | Status: DISCONTINUED | OUTPATIENT
Start: 2021-12-18 | End: 2021-12-18

## 2021-12-17 RX ORDER — DEXTROSE 50 % IN WATER 50 %
15 SYRINGE (ML) INTRAVENOUS ONCE
Refills: 0 | Status: DISCONTINUED | OUTPATIENT
Start: 2021-12-17 | End: 2021-12-18

## 2021-12-17 RX ORDER — ONDANSETRON 8 MG/1
4 TABLET, FILM COATED ORAL EVERY 6 HOURS
Refills: 0 | Status: DISCONTINUED | OUTPATIENT
Start: 2021-12-17 | End: 2021-12-18

## 2021-12-17 RX ORDER — DEXTROSE 50 % IN WATER 50 %
12.5 SYRINGE (ML) INTRAVENOUS ONCE
Refills: 0 | Status: DISCONTINUED | OUTPATIENT
Start: 2021-12-17 | End: 2021-12-18

## 2021-12-17 RX ORDER — POLYETHYLENE GLYCOL 3350 17 G/17G
17 POWDER, FOR SOLUTION ORAL AT BEDTIME
Refills: 0 | Status: DISCONTINUED | OUTPATIENT
Start: 2021-12-17 | End: 2021-12-18

## 2021-12-17 RX ORDER — SODIUM CHLORIDE 9 MG/ML
3 INJECTION INTRAMUSCULAR; INTRAVENOUS; SUBCUTANEOUS EVERY 8 HOURS
Refills: 0 | Status: DISCONTINUED | OUTPATIENT
Start: 2021-12-17 | End: 2021-12-17

## 2021-12-17 RX ORDER — CELECOXIB 200 MG/1
200 CAPSULE ORAL EVERY 12 HOURS
Refills: 0 | Status: DISCONTINUED | OUTPATIENT
Start: 2021-12-19 | End: 2021-12-18

## 2021-12-17 RX ORDER — DEXTROSE 50 % IN WATER 50 %
25 SYRINGE (ML) INTRAVENOUS ONCE
Refills: 0 | Status: DISCONTINUED | OUTPATIENT
Start: 2021-12-17 | End: 2021-12-18

## 2021-12-17 RX ORDER — INSULIN LISPRO 100/ML
VIAL (ML) SUBCUTANEOUS
Refills: 0 | Status: DISCONTINUED | OUTPATIENT
Start: 2021-12-17 | End: 2021-12-18

## 2021-12-17 RX ORDER — APREPITANT 80 MG/1
40 CAPSULE ORAL ONCE
Refills: 0 | Status: COMPLETED | OUTPATIENT
Start: 2021-12-17 | End: 2021-12-17

## 2021-12-17 RX ADMIN — SODIUM CHLORIDE 500 MILLILITER(S): 9 INJECTION INTRAMUSCULAR; INTRAVENOUS; SUBCUTANEOUS at 14:50

## 2021-12-17 RX ADMIN — Medication 975 MILLIGRAM(S): at 22:19

## 2021-12-17 RX ADMIN — OXYCODONE HYDROCHLORIDE 10 MILLIGRAM(S): 5 TABLET ORAL at 17:40

## 2021-12-17 RX ADMIN — Medication 325 MILLIGRAM(S): at 17:10

## 2021-12-17 RX ADMIN — APREPITANT 40 MILLIGRAM(S): 80 CAPSULE ORAL at 08:50

## 2021-12-17 RX ADMIN — Medication 100 MILLIGRAM(S): at 18:12

## 2021-12-17 RX ADMIN — OXYCODONE HYDROCHLORIDE 10 MILLIGRAM(S): 5 TABLET ORAL at 17:10

## 2021-12-17 RX ADMIN — Medication 975 MILLIGRAM(S): at 08:49

## 2021-12-17 RX ADMIN — OXYCODONE HYDROCHLORIDE 10 MILLIGRAM(S): 5 TABLET ORAL at 20:03

## 2021-12-17 RX ADMIN — CELECOXIB 400 MILLIGRAM(S): 200 CAPSULE ORAL at 08:50

## 2021-12-17 RX ADMIN — OXYCODONE HYDROCHLORIDE 10 MILLIGRAM(S): 5 TABLET ORAL at 20:40

## 2021-12-17 RX ADMIN — Medication 975 MILLIGRAM(S): at 23:00

## 2021-12-17 NOTE — DISCHARGE NOTE PROVIDER - NSDCMRMEDTOKEN_GEN_ALL_CORE_FT
amLODIPine 5 mg oral tablet: 1 tab(s) orally once a day  glipiZIDE 5 mg oral tablet: 1 tab(s) orally once a day   amLODIPine 5 mg oral tablet: 1 tab(s) orally once a day  aspirin 325 mg oral tablet: 1 tab(s) orally 2 times a day  celecoxib 200 mg oral capsule: 1 cap(s) orally every 12 hours  glipiZIDE 5 mg oral tablet: 1 tab(s) orally once a day  omeprazole 20 mg oral delayed release capsule: 1 cap(s) orally once a day   oxyCODONE 5 mg oral tablet: 1 tab(s) orally every 4 hours, As Needed - Pain MDD:6  Senna S 50 mg-8.6 mg oral tablet: 2 tab(s) orally once a day (at bedtime)

## 2021-12-17 NOTE — CONSULT NOTE ADULT - PROBLEM SELECTOR RECOMMENDATION 4
DVT prophylaxis  - as per ortho protocol  Opioid induced constipation  prophylaxis - bowel regimen     Thank you for the courtesy of this consult ,   Hospitalist team will follow .

## 2021-12-17 NOTE — DISCHARGE NOTE PROVIDER - HOSPITAL COURSE
The patient underwent a LEFT TOTAL KNEE REPLACEMENT onXX. The patient received antibiotics consistent with SCIP guidelines. The patient underwent the procedure and had no intra-operative complications. Post-operatively, the patient was seen by medicine and PT. The patient received ASA / LOVENOX for clot prevention. The patient received pain medications per orthopedic pain management pathway and the pain was appropriately controlled. The patient did not have any post-operative medical complications. The patient was discharged in stable condition. The patient underwent a LEFT TOTAL KNEE REPLACEMENT on 12/17/21. The patient received antibiotics consistent with SCIP guidelines. The patient underwent the procedure and had no intra-operative complications. Post-operatively, the patient was seen by medicine and PT. The patient received ASPIRIN for clot prevention. The patient received pain medications per orthopedic pain management pathway and the pain was appropriately controlled. The patient did not have any post-operative medical complications. The patient was discharged in stable condition.

## 2021-12-17 NOTE — DISCHARGE NOTE PROVIDER - NSDCFUADDINST_GEN_ALL_CORE_FT
The patient will be seen in the office between 2-3 weeks for wound check.   **Your first post-operative visit has been scheduled prior to your admission. PLEASE CONTACT OFFICE TO CONFIRM THE APPOINTMENT DATE. Tape will be removed at that time.  **  The silver based dressing is to be removed 7 days from the date of surgery (12/24).   ** CONTACT THE OFFICE IF THE FOLLOWING DEVELOP:  - the dressing becomes soiled or saturated  - you develop a fever greater that 101F  - the wound becomes red or you develop blistering around the wound  * Patient may shower after post-op day #3 (12/20).   * The patient will continue home PT consistent with  total knee replacement protocol. Transition to outpatient PT will occur at the time of the first office visit.   * The patient will practice knee extension exercises regularly to minimize hamstring contraction.   * The patient is FULL weight bearing.  * The patient will continue ASPIRIN for 6 weeks after surgery for blood clot prevention.  * While on aspirin, the patient will take daily omeprazole or other similar medication to protect the stomach from irritation.   * The patient will take OXYCODONE AND TYLENOL for pain control and adjust according to prescription and patient needs. Contact the office if pain increases while taking prescribed pain medications or related concerns develop.  * Celebrex will be taken twice daily for 3 weeks for pain control and prevention of excessive bone growth. Additional prescription may be requested at your office follow-up visit.   * The patient will take Senna S while taking oxycodone to prevent narcotic associated constipation.  Additionally, increase water intake (drink at least 8 glasses of water daily) and try adding fiber to the diet by eating fruits, vegetables and foods that are rich in grains. If constipation is experienced, contact the medical/primary care provider to discuss further treatment options.  * To avoid injury at home:  - continue use of rolling walker until cleared by physical therapist  - have family or friend remove all throw rug or objects in hallways that may present a trip hazard.  - if you experience any dizziness or medical concerns, call your medical doctor or  911.  * The implant may activate metal detection devices.

## 2021-12-17 NOTE — CONSULT NOTE ADULT - SUBJECTIVE AND OBJECTIVE BOX
Patient is a 61y old  Male who is s/p L TKA , POD # 0 . Tolerated procedure well .     CC: L knee chronic pain       HPI :60 yo M PMH of HTN, DM2, OA, presents with bilateral aching knee pain, left knee pain is worse than right knee pain. He reports that the pain has been present for a couple of years. His pain is in the medial aspect of his knees. He sometimes kneels while at work which causes more pain. His left knee howard and clicks. He denies locking. He received cortisone injections in 2019.       PAST MEDICAL & SURGICAL HISTORY:  Unilateral primary osteoarthritis, left knee    Hypertension    H/O left knee surgery    H/O colonoscopy        Social History:  Tobacco - denies   ETOH - occasionally   Illicit drug abuse - denies    FAMILY HISTORY:  FH: diabetes mellitus (Mother)        Allergies    No Known Allergies    Intolerances        HOME MEDICATIONS :   · 	glipiZIDE 5 mg oral tablet: Last Dose Taken:  , 1 tab(s) orally once a day  · 	amLODIPine 5 mg oral tablet: Last Dose Taken:  , 1 tab(s) orally once a day    REVIEW OF SYSTEMS:    L knee chronic pain , all other systems are reviewed and are negative .     MEDICATIONS  (STANDING):  acetaminophen     Tablet .. 975 milliGRAM(s) Oral every 8 hours  aspirin 325 milliGRAM(s) Oral two times a day  ceFAZolin   IVPB 2000 milliGRAM(s) IV Intermittent <User Schedule>  dextrose 40% Gel 15 Gram(s) Oral once  dextrose 5%. 1000 milliLiter(s) (50 mL/Hr) IV Continuous <Continuous>  dextrose 5%. 1000 milliLiter(s) (100 mL/Hr) IV Continuous <Continuous>  dextrose 50% Injectable 25 Gram(s) IV Push once  dextrose 50% Injectable 12.5 Gram(s) IV Push once  dextrose 50% Injectable 25 Gram(s) IV Push once  glucagon  Injectable 1 milliGRAM(s) IntraMuscular once  insulin lispro (ADMELOG) corrective regimen sliding scale   SubCutaneous three times a day before meals  pantoprazole    Tablet 40 milliGRAM(s) Oral before breakfast  polyethylene glycol 3350 17 Gram(s) Oral at bedtime  senna 2 Tablet(s) Oral at bedtime    MEDICATIONS  (PRN):  aluminum hydroxide/magnesium hydroxide/simethicone Suspension 30 milliLiter(s) Oral four times a day PRN Indigestion  HYDROmorphone   Tablet 4 milliGRAM(s) Oral every 3 hours PRN Severe Pain (7 - 10)  HYDROmorphone  Injectable 0.5 milliGRAM(s) IV Push every 3 hours PRN Breakthrough  magnesium hydroxide Suspension 30 milliLiter(s) Oral daily PRN Constipation  ondansetron Injectable 4 milliGRAM(s) IV Push every 6 hours PRN Nausea and/or Vomiting  oxyCODONE    IR 5 milliGRAM(s) Oral every 3 hours PRN Mild Pain (1 - 3)  oxyCODONE    IR 10 milliGRAM(s) Oral every 3 hours PRN Moderate Pain (4 - 6)      Vital Signs Last 24 Hrs  T(C): 36.1 (17 Dec 2021 15:15), Max: 36.6 (17 Dec 2021 08:31)  T(F): 97 (17 Dec 2021 15:15), Max: 97.9 (17 Dec 2021 08:31)  HR: 52 (17 Dec 2021 15:15) (46 - 60)  BP: 130/72 (17 Dec 2021 15:15) (104/65 - 130/72)  BP(mean): --  RR: 18 (17 Dec 2021 15:15) (15 - 20)  SpO2: 97% (17 Dec 2021 15:15) (96% - 98%)    PHYSICAL EXAM:    GENERAL: NAD, well-groomed, well-developed  HEAD:  Atraumatic, Normocephalic  EYES: EOMI, PERRLA, conjunctiva and sclera clear  NECK: Supple, No JVD, Normal thyroid  NERVOUS SYSTEM:  Alert & Oriented X4 , no focal deficit   CHEST/LUNG: CTA  b/l,  no rales, rhonchi, wheezing, or rubs  HEART: Regular rate and rhythm; No murmurs, rubs, or gallops  ABDOMEN: Soft, Nontender, Nondistended; Bowel sounds present  EXTREMITIES:  2+ Peripheral Pulses, No clubbing, cyanosis, or edema ,   LYMPH: No lymphadenopathy noted  SKIN: No rashes or lesions    LABS: Pending     RADIOLOGY & ADDITIONAL STUDIES:

## 2021-12-17 NOTE — CONSULT NOTE ADULT - ASSESSMENT
:60 yo M PMH of HTN, DM2, OA, presents with bilateral aching knee pain, left knee pain is worse than right knee pain. He reports that the pain has been present for a couple of years. His pain is in the medial aspect of his knees. He sometimes kneels while at work which causes more pain. His left knee howard and clicks. He denies locking. He received cortisone injections in 2019.

## 2021-12-17 NOTE — DISCHARGE NOTE PROVIDER - CARE PROVIDER_API CALL
Joshua Groves)  Orthopaedic Surgery  [address], [address]  [place] [address]  Phone: [phone]  Fax: [phone]  Follow Up Time:

## 2021-12-17 NOTE — DISCHARGE NOTE PROVIDER - NSDCFUSCHEDAPPT_GEN_ALL_CORE_FT
FRANK [facility] ; 01/10/2022 ; JANETH OrthoSurg [address]  [facility] ; NPP Ortho Radha [address] [facility] [address]  FRANK MOI ; 03/09/2022 ; NP Ortho Radha [address] [facility] [facility]

## 2021-12-18 ENCOUNTER — TRANSCRIPTION ENCOUNTER (OUTPATIENT)
Age: 61
End: 2021-12-18

## 2021-12-18 VITALS
RESPIRATION RATE: 18 BRPM | OXYGEN SATURATION: 97 % | TEMPERATURE: 99 F | DIASTOLIC BLOOD PRESSURE: 71 MMHG | HEART RATE: 55 BPM | SYSTOLIC BLOOD PRESSURE: 133 MMHG

## 2021-12-18 LAB
ANION GAP SERPL CALC-SCNC: 9 MMOL/L — SIGNIFICANT CHANGE UP (ref 5–17)
BUN SERPL-MCNC: 19.5 MG/DL — SIGNIFICANT CHANGE UP (ref 8–20)
CALCIUM SERPL-MCNC: 9 MG/DL — SIGNIFICANT CHANGE UP (ref 8.6–10.2)
CHLORIDE SERPL-SCNC: 105 MMOL/L — SIGNIFICANT CHANGE UP (ref 98–107)
CO2 SERPL-SCNC: 23 MMOL/L — SIGNIFICANT CHANGE UP (ref 22–29)
CREAT SERPL-MCNC: 0.7 MG/DL — SIGNIFICANT CHANGE UP (ref 0.5–1.3)
GLUCOSE BLDC GLUCOMTR-MCNC: 110 MG/DL — HIGH (ref 70–99)
GLUCOSE BLDC GLUCOMTR-MCNC: 176 MG/DL — HIGH (ref 70–99)
GLUCOSE SERPL-MCNC: 108 MG/DL — HIGH (ref 70–99)
HCT VFR BLD CALC: 34.8 % — LOW (ref 39–50)
HGB BLD-MCNC: 11.5 G/DL — LOW (ref 13–17)
MCHC RBC-ENTMCNC: 30.2 PG — SIGNIFICANT CHANGE UP (ref 27–34)
MCHC RBC-ENTMCNC: 33 GM/DL — SIGNIFICANT CHANGE UP (ref 32–36)
MCV RBC AUTO: 91.3 FL — SIGNIFICANT CHANGE UP (ref 80–100)
PLATELET # BLD AUTO: 193 K/UL — SIGNIFICANT CHANGE UP (ref 150–400)
POTASSIUM SERPL-MCNC: 4.2 MMOL/L — SIGNIFICANT CHANGE UP (ref 3.5–5.3)
POTASSIUM SERPL-SCNC: 4.2 MMOL/L — SIGNIFICANT CHANGE UP (ref 3.5–5.3)
RBC # BLD: 3.81 M/UL — LOW (ref 4.2–5.8)
RBC # FLD: 13.5 % — SIGNIFICANT CHANGE UP (ref 10.3–14.5)
SODIUM SERPL-SCNC: 137 MMOL/L — SIGNIFICANT CHANGE UP (ref 135–145)
WBC # BLD: 14.82 K/UL — HIGH (ref 3.8–10.5)
WBC # FLD AUTO: 14.82 K/UL — HIGH (ref 3.8–10.5)

## 2021-12-18 PROCEDURE — 73560 X-RAY EXAM OF KNEE 1 OR 2: CPT

## 2021-12-18 PROCEDURE — 36415 COLL VENOUS BLD VENIPUNCTURE: CPT

## 2021-12-18 PROCEDURE — 97110 THERAPEUTIC EXERCISES: CPT

## 2021-12-18 PROCEDURE — C1713: CPT

## 2021-12-18 PROCEDURE — C1776: CPT

## 2021-12-18 PROCEDURE — 97116 GAIT TRAINING THERAPY: CPT

## 2021-12-18 PROCEDURE — 82962 GLUCOSE BLOOD TEST: CPT

## 2021-12-18 PROCEDURE — 99232 SBSQ HOSP IP/OBS MODERATE 35: CPT

## 2021-12-18 PROCEDURE — S2900: CPT

## 2021-12-18 PROCEDURE — 97163 PT EVAL HIGH COMPLEX 45 MIN: CPT

## 2021-12-18 PROCEDURE — 85027 COMPLETE CBC AUTOMATED: CPT

## 2021-12-18 PROCEDURE — 80048 BASIC METABOLIC PNL TOTAL CA: CPT

## 2021-12-18 RX ORDER — OXYCODONE HYDROCHLORIDE 5 MG/1
1 TABLET ORAL
Qty: 28 | Refills: 0
Start: 2021-12-18

## 2021-12-18 RX ORDER — CELECOXIB 200 MG/1
1 CAPSULE ORAL
Qty: 42 | Refills: 0
Start: 2021-12-18 | End: 2022-01-07

## 2021-12-18 RX ORDER — OMEPRAZOLE 10 MG/1
1 CAPSULE, DELAYED RELEASE ORAL
Qty: 42 | Refills: 0
Start: 2021-12-18 | End: 2022-01-28

## 2021-12-18 RX ORDER — SENNOSIDES/DOCUSATE SODIUM 8.6MG-50MG
2 TABLET ORAL
Qty: 30 | Refills: 0
Start: 2021-12-18 | End: 2022-01-01

## 2021-12-18 RX ORDER — ASPIRIN/CALCIUM CARB/MAGNESIUM 324 MG
1 TABLET ORAL
Qty: 84 | Refills: 0
Start: 2021-12-18 | End: 2022-01-28

## 2021-12-18 RX ADMIN — Medication 975 MILLIGRAM(S): at 12:32

## 2021-12-18 RX ADMIN — OXYCODONE HYDROCHLORIDE 10 MILLIGRAM(S): 5 TABLET ORAL at 06:20

## 2021-12-18 RX ADMIN — Medication 975 MILLIGRAM(S): at 06:20

## 2021-12-18 RX ADMIN — Medication 100 MILLIGRAM(S): at 05:40

## 2021-12-18 RX ADMIN — PANTOPRAZOLE SODIUM 40 MILLIGRAM(S): 20 TABLET, DELAYED RELEASE ORAL at 05:43

## 2021-12-18 RX ADMIN — Medication 975 MILLIGRAM(S): at 13:30

## 2021-12-18 RX ADMIN — Medication 325 MILLIGRAM(S): at 05:42

## 2021-12-18 RX ADMIN — OXYCODONE HYDROCHLORIDE 10 MILLIGRAM(S): 5 TABLET ORAL at 05:42

## 2021-12-18 RX ADMIN — SODIUM CHLORIDE 125 MILLILITER(S): 9 INJECTION INTRAMUSCULAR; INTRAVENOUS; SUBCUTANEOUS at 05:41

## 2021-12-18 RX ADMIN — Medication 975 MILLIGRAM(S): at 05:41

## 2021-12-18 NOTE — PROGRESS NOTE ADULT - SUBJECTIVE AND OBJECTIVE BOX
Patient was seen and examined at approximately 4:15pm    ORTHO-POST-OP PROGRESS NOTE:      [id number]    MOI MARIE      PROCEDURE: Left total knee arthroplasty     DOS: 12/17/2021      SUBJECTIVE: 61y M Patient seen and examined. Patient reports of moderate discomfort that is controlled by pain medications. Patient tolerating Oral hydration. Patient denies of acute sensory or motor changes.       Medications:  acetaminophen     Tablet .. 975 milliGRAM(s) Oral every 8 hours  aluminum hydroxide/magnesium hydroxide/simethicone Suspension 30 milliLiter(s) Oral four times a day PRN  aspirin 325 milliGRAM(s) Oral two times a day  ceFAZolin   IVPB 2000 milliGRAM(s) IV Intermittent <User Schedule>  dextrose 40% Gel 15 Gram(s) Oral once  dextrose 5%. 1000 milliLiter(s) IV Continuous <Continuous>  dextrose 5%. 1000 milliLiter(s) IV Continuous <Continuous>  dextrose 50% Injectable 25 Gram(s) IV Push once  dextrose 50% Injectable 12.5 Gram(s) IV Push once  dextrose 50% Injectable 25 Gram(s) IV Push once  glucagon  Injectable 1 milliGRAM(s) IntraMuscular once  HYDROmorphone   Tablet 4 milliGRAM(s) Oral every 3 hours PRN  HYDROmorphone  Injectable 0.5 milliGRAM(s) IV Push every 3 hours PRN  insulin lispro (ADMELOG) corrective regimen sliding scale   SubCutaneous three times a day before meals  magnesium hydroxide Suspension 30 milliLiter(s) Oral daily PRN  ondansetron Injectable 4 milliGRAM(s) IV Push every 6 hours PRN  oxyCODONE    IR 5 milliGRAM(s) Oral every 3 hours PRN  oxyCODONE    IR 10 milliGRAM(s) Oral every 3 hours PRN  pantoprazole    Tablet 40 milliGRAM(s) Oral before breakfast  polyethylene glycol 3350 17 Gram(s) Oral at bedtime  senna 2 Tablet(s) Oral at bedtime      Vitals:  T(C): 36.1 (12-17-21 @ 15:15), Max: 36.6 (12-17-21 @ 08:31)  HR: 52 (12-17-21 @ 15:15) (46 - 60)  BP: 130/72 (12-17-21 @ 15:15) (104/65 - 130/72)  RR: 18 (12-17-21 @ 15:15) (15 - 20)  SpO2: 97% (12-17-21 @ 15:15) (96% - 98%)  Wt(kg): --    I&O's Detail    17 Dec 2021 07:01  -  17 Dec 2021 16:33  --------------------------------------------------------  IN:    Lactated Ringers: 225 mL    Sodium Chloride 0.9% Bolus: 500 mL  Total IN: 725 mL    OUT:  Total OUT: 0 mL    Total NET: 725 mL      PHYSICAL EXAM:     Constitutional: Alert, responsive, in no acute distress.     Injured Extremity:          Dressing: ACE Clean/dry/intact. No active bleeding or discharge noted.            Skin: Wound is clean and intact. No active discharge. No wound dehiscence.            Sensation: normal to light touch. No focal deficits noted of the lower extremities.                Motor exam: 5/5 hip flexion, knee flexion and ankle plantar and dorsiflexion, EHL, FHL intact. No focal weaknesses noted.                                                               Vascular:  +2 Distal Pulses warm well perfused; capillary refill <3 seconds                                                       A/P :  61y Male S/P Left total knee arthroplasty POD#0  -  Pain control  -  DVT ppx: SCDs   Pharmacolgic  ASA 325mg BID  -  PT and out of bed today  - Incentive Spirometry  -  Weight bearing status: WBAT with walker assistance  -  Discharge planning  
MOI MARIE    [id number]    61y      Male    Patient is a 61y old  Male who presents with a chief complaint of s/p L TKA (17 Dec 2021 16:14)      INTERVAL HPI/OVERNIGHT EVENTS:    Patient is doing ok, pain is well managed with pain medications, working well with PT, has no fever, chills, chest pain, SOB, nausea, vomiting, constipation.     REVIEW OF SYSTEMS:    CONSTITUTIONAL: No fever, fatigue  RESPIRATORY: No cough, No shortness of breath  CARDIOVASCULAR: No chest pain, palpitations  GASTROINTESTINAL: No abdominal, No nausea, vomiting  NEUROLOGICAL: No headaches,  loss of strength.  MISCELLANEOUS: Left knee pain is well controlled       Vital Signs Last 24 Hrs  T(C): 37.1 (18 Dec 2021 08:53), Max: 37.1 (18 Dec 2021 08:53)  T(F): 98.7 (18 Dec 2021 08:53), Max: 98.7 (18 Dec 2021 08:53)  HR: 64 (18 Dec 2021 08:53) (46 - 65)  BP: 121/69 (18 Dec 2021 08:53) (104/65 - 151/51)  RR: 18 (18 Dec 2021 08:53) (15 - 20)  SpO2: 94% (18 Dec 2021 08:53) (92% - 98%)    PHYSICAL EXAM:    GENERAL: Middle age male looking comfortable   HEENT: PERRL, +EOMI  NECK: soft, Supple, No JVD,   CHEST/LUNG: Clear to auscultate bilaterally; No wheezing  HEART: S1S2+, Regular rate and rhythm; No murmurs  ABDOMEN: Soft, Nontender, Nondistended; Bowel sounds present  EXTREMITIES:  2+ Peripheral Pulses, No edema, left knee with clean dressing on.   SKIN: No rashes or lesions.  NEURO: AAOX3, no focal deficits, no motor r sensory loss  PSYCH: normal mood      LABS:                        11.5   14.82 )-----------( 193      ( 18 Dec 2021 07:37 )             34.8     12-18    137  |  105  |  19.5  ----------------------------<  108<H>  4.2   |  23.0  |  0.70    Ca    9.0      18 Dec 2021 07:37              I&O's Summary    17 Dec 2021 07:01  -  18 Dec 2021 07:00  --------------------------------------------------------  IN: 2225 mL / OUT: 2050 mL / NET: 175 mL        MEDICATIONS  (STANDING):  acetaminophen     Tablet .. 975 milliGRAM(s) Oral every 8 hours  aspirin 325 milliGRAM(s) Oral two times a day  dextrose 40% Gel 15 Gram(s) Oral once  dextrose 5%. 1000 milliLiter(s) (50 mL/Hr) IV Continuous <Continuous>  dextrose 5%. 1000 milliLiter(s) (100 mL/Hr) IV Continuous <Continuous>  dextrose 50% Injectable 25 Gram(s) IV Push once  dextrose 50% Injectable 12.5 Gram(s) IV Push once  dextrose 50% Injectable 25 Gram(s) IV Push once  glucagon  Injectable 1 milliGRAM(s) IntraMuscular once  insulin lispro (ADMELOG) corrective regimen sliding scale   SubCutaneous three times a day before meals  pantoprazole    Tablet 40 milliGRAM(s) Oral before breakfast  polyethylene glycol 3350 17 Gram(s) Oral at bedtime  senna 2 Tablet(s) Oral at bedtime  sodium chloride 0.9%. 1000 milliLiter(s) (125 mL/Hr) IV Continuous <Continuous>    MEDICATIONS  (PRN):  aluminum hydroxide/magnesium hydroxide/simethicone Suspension 30 milliLiter(s) Oral four times a day PRN Indigestion  HYDROmorphone   Tablet 4 milliGRAM(s) Oral every 3 hours PRN Severe Pain (7 - 10)  HYDROmorphone  Injectable 0.5 milliGRAM(s) IV Push every 3 hours PRN Breakthrough  magnesium hydroxide Suspension 30 milliLiter(s) Oral daily PRN Constipation  ondansetron Injectable 4 milliGRAM(s) IV Push every 6 hours PRN Nausea and/or Vomiting  oxyCODONE    IR 5 milliGRAM(s) Oral every 3 hours PRN Mild Pain (1 - 3)  oxyCODONE    IR 10 milliGRAM(s) Oral every 3 hours PRN Moderate Pain (4 - 6)        
MOI MARIE  [id number]    History: 61y Male is status post left total knee arthroplasty, POD # 1. Patient is doing well and is comfortable. The patient's pain is controlled using the prescribed pain medications. The patient is participating in physical therapy. Denies nausea, vomiting, chest pain, shortness of breath, abdominal pain or dizziness. No new complaints.                              11.5   14.82 )-----------( 193      ( 18 Dec 2021 07:37 )             34.8     12-18    137  |  105  |  19.5  ----------------------------<  108<H>  4.2   |  23.0  |  0.70    Ca    9.0      18 Dec 2021 07:37        MEDICATIONS  (STANDING):  acetaminophen     Tablet .. 975 milliGRAM(s) Oral every 8 hours  aspirin 325 milliGRAM(s) Oral two times a day  dextrose 40% Gel 15 Gram(s) Oral once  dextrose 5%. 1000 milliLiter(s) (50 mL/Hr) IV Continuous <Continuous>  dextrose 5%. 1000 milliLiter(s) (100 mL/Hr) IV Continuous <Continuous>  dextrose 50% Injectable 25 Gram(s) IV Push once  dextrose 50% Injectable 12.5 Gram(s) IV Push once  dextrose 50% Injectable 25 Gram(s) IV Push once  glucagon  Injectable 1 milliGRAM(s) IntraMuscular once  insulin lispro (ADMELOG) corrective regimen sliding scale   SubCutaneous three times a day before meals  pantoprazole    Tablet 40 milliGRAM(s) Oral before breakfast  polyethylene glycol 3350 17 Gram(s) Oral at bedtime  senna 2 Tablet(s) Oral at bedtime  sodium chloride 0.9%. 1000 milliLiter(s) (125 mL/Hr) IV Continuous <Continuous>    MEDICATIONS  (PRN):  aluminum hydroxide/magnesium hydroxide/simethicone Suspension 30 milliLiter(s) Oral four times a day PRN Indigestion  HYDROmorphone   Tablet 4 milliGRAM(s) Oral every 3 hours PRN Severe Pain (7 - 10)  HYDROmorphone  Injectable 0.5 milliGRAM(s) IV Push every 3 hours PRN Breakthrough  magnesium hydroxide Suspension 30 milliLiter(s) Oral daily PRN Constipation  ondansetron Injectable 4 milliGRAM(s) IV Push every 6 hours PRN Nausea and/or Vomiting  oxyCODONE    IR 5 milliGRAM(s) Oral every 3 hours PRN Mild Pain (1 - 3)  oxyCODONE    IR 10 milliGRAM(s) Oral every 3 hours PRN Moderate Pain (4 - 6)    Vital Signs Last 24 Hrs  T(C): 36.7 (18 Dec 2021 05:13), Max: 36.7 (17 Dec 2021 23:23)  T(F): 98.1 (18 Dec 2021 05:13), Max: 98.1 (17 Dec 2021 23:23)  HR: 60 (18 Dec 2021 05:13) (46 - 65)  BP: 129/67 (18 Dec 2021 05:13) (104/65 - 151/51)  BP(mean): --  RR: 18 (18 Dec 2021 05:13) (15 - 20)  SpO2: 95% (18 Dec 2021 05:13) (92% - 98%)  Lying in bed in NAD, awake and alert    Physical exam: Left lower extremity- The left knee dressing is clean, dry and intact. No drainage or discharge. No erythema is noted. No blistering. No ecchymosis. The calf is supple. No calf tenderness. Sensation to light touch is grossly intact distally. Motor function distally is 5/5. Extensor hallucis longus and flexor hallucis longus are intact. No foot drop. 2+ dorsalis pedis pulse. Capillary refill is less than 2 seconds. No cyanosis.    Primary Orthopedic Assessment:  •	s/p LEFT total knee replacement, POD#1	    Plan:   •DVT prophylaxis as prescribed- asa, including use of compression devices and ankle pumps  •Continue physical therapy  •Weightbearing as tolerated of the left lower extremity with assistance of a walker  •Incentive spirometry encouraged  •Pain control as clinically indicated  •Discharge planning – anticipated discharge is Home after cleared by PT and medicine, likely today

## 2021-12-18 NOTE — PROGRESS NOTE ADULT - ASSESSMENT
60 yo M PMH of HTN, DM2, OA, presents with bilateral aching knee pain, left knee pain is worse than right knee pain. He reports that the pain has been present for a couple of years. His pain is in the medial aspect of his knees. He sometimes kneels while at work which causes more pain. His left knee howard and clicks. He denies locking. He received cortisone injections in 2019.      Problem/Recommendation - 1:  ·  Problem: Unilateral primary osteoarthritis, left knee.   ·  Recommendation: s/p L TKA ,   PT/OT/pain mgmt  DVT prophylaxis- as per ortho  Abx as per SCIP  Incentive spirometry  Prophylaxis of opioid  induced constipation.     Problem/Recommendation - 2:  ·  Problem: Hypertension.   ·  Recommendation: - continue Amlodipine with parameters.     Problem/Recommendation - 3:  ·  Problem: DM (diabetes mellitus), type 2.   ·  Recommendation: - not on Insulin , hold oral hypoglycemic for now ,   may restart on d/c Home.     Problem/Recommendation - 4:  ·  Problem: Need for prophylactic measure.   ·  Recommendation: DVT prophylaxis  - as per ortho protocol  Opioid induced constipation  prophylaxis - bowel regimen     Acute blood loss anemia due to procedure: Iron supplement    Leucocytosis: L:ikely reactive, no focus of infection.     Patient is stable from the medicine point of view for discharge pending PT and Ortho eval.

## 2021-12-18 NOTE — PHYSICAL THERAPY INITIAL EVALUATION ADULT - PASSIVE RANGE OF MOTION EXAMINATION, REHAB EVAL
except left knee 0-45 degree/bilateral upper extremity Passive ROM was WFL (within functional limits)/bilateral lower extremity Passive ROM was WFL (within functional limits)

## 2021-12-18 NOTE — PHYSICAL THERAPY INITIAL EVALUATION ADULT - RANGE OF MOTION EXAMINATION, REHAB EVAL
except left knee 0-45 degrees/bilateral upper extremity ROM was WFL (within functional limits)/bilateral lower extremity ROM was WFL (within functional limits)

## 2021-12-18 NOTE — DISCHARGE NOTE NURSING/CASE MANAGEMENT/SOCIAL WORK - PATIENT PORTAL LINK FT
You can access the FollowMyHealth Patient Portal offered by [facility] by registering at the following website: [email or web address]. By joining [facility]’s FollowMyHealth portal, you will also be able to view your health information using other applications (apps) compatible with our system.

## 2021-12-18 NOTE — PHYSICAL THERAPY INITIAL EVALUATION ADULT - ACTIVE RANGE OF MOTION EXAMINATION, REHAB EVAL
except left knee 0-45 degree/bilateral upper extremity Active ROM was WFL (within functional limits)/bilateral  lower extremity Active ROM was WFL (within functional limits)

## 2021-12-18 NOTE — DISCHARGE NOTE NURSING/CASE MANAGEMENT/SOCIAL WORK - NSSCTYPOFSERV_GEN_ALL_CORE
Visiting nurse / PT Interpolation Flap Text: A decision was made to reconstruct the defect utilizing an interpolation axial flap and a staged reconstruction.  A telfa template was made of the defect.  This telfa template was then used to outline the interpolation flap.  The donor area for the pedicle flap was then injected with anesthesia.  The flap was excised through the skin and subcutaneous tissue down to the layer of the underlying musculature.  The interpolation flap was carefully excised within this deep plane to maintain its blood supply.  The edges of the donor site were undermined.   The donor site was closed in a primary fashion.  The pedicle was then rotated into position and sutured.  Once the tube was sutured into place, adequate blood supply was confirmed with blanching and refill.  The pedicle was then wrapped with xeroform gauze and dressed appropriately with a telfa and gauze bandage to ensure continued blood supply and protect the attached pedicle.

## 2021-12-18 NOTE — DISCHARGE NOTE NURSING/CASE MANAGEMENT/SOCIAL WORK - NSDCPEFALRISK_GEN_ALL_CORE
For information on Fall & Injury Prevention, visit: [email or web address] OR  [email or web address] OR  https://www.cdc.gov/steadi/patient.html

## 2021-12-18 NOTE — PHYSICAL THERAPY INITIAL EVALUATION ADULT - WEIGHT-BEARING RESTRICTIONS: GAIT, REHAB EVAL
Continuity of Care Document (CCD)

 Created on:2020



Patient:Rosalind Vega

Sex:Female

:1951

External Reference #:[id number]





Demographics







 Address  [facility] [place] [address]

 

 Home Phone  [phone]

 

 Mobile Phone  [phone]

 

 Email Address  [email or web address]

 

 Preferred Language  en

 

 Marital Status  Not  or 

 

 [employer] Affiliation  Unknown

 

 Race  White

 

 Ethnic Group  Not  or 









Author







 Name  Victor Hugo Pizarro

 

 Address  [address] [place] [address]









Care Team Providers







 Name  Role  Phone

 

 DimplewaleJannet ([facility]) -  Care Team Information   [phone]
[phone]



 Surgery of the Hand    

 

 Vis[facility] Nurse Services - Home Health  Care Team Information   [phone]
[phone]

 

 Sarah Sotelo - Podiatrist  Care Team Information   [phone]
[phone]

 

 [facility]  Care Team Information   [phone]
[phone]



 Therapy - Physical Therapy    









Problems







 Active Problems  Provider  Date

 

 Hypothyroidism  Scottie Morales M.D.  Onset: 2008

 

 Headache  Scottie Morales M.D.  Onset: 2008

 

 Seizure  Scottie Morales M.D.  Onset: 2008

 

 Anxiety state  Scottie Morales M.D.  Onset: 2011

 

 Acute sinusitis  Scottie Morales M.D.  Onset: 10/10/2011

 

 Contusion of hand  Scottie Morales M.D.  Onset: 10/10/2011

 

 Impacted cerumen  Scottie Morales M.D.  Onset: 2011

 

 Low back pain  Scottie Morales M.D.  Onset: 2012

 

 Benign neoplasm of skin  Scottie Morales M.D.  Onset: 2012

 

 Allergic rhinitis  Scottie Morales M.D.  Onset: 2012

 

 Contusion of back  Scottie Morales M.D.  Onset: 2012

 

 Contusion of face, scalp and neck, excluding  Scottie Morales M.D.  Onset: 



 eye(s)    

 

 Abrasion and/or friction burn of face without  Scottie Morales M.D.  Onset: 



 infection    

 

 Closed fracture of carpal bone  Scottie Morales M.D.  Onset: 2013

 

 Contusion of wrist  Scottie Morales M.D.  Onset: 2013

 

 Contusion of chest  Scottie Morales M.D.  Onset: 2013

 

 Chalazion  Scottie Morales M.D.  Onset: 2014

 

 Obstructive sleep apnea syndrome  Scottie Morales M.D.  Onset: 2014

 

 Tobacco user  Scottie Morales M.D.  Onset: 2014

 

 Plantar fascial fibromatosis  Scottie Morales M.D.  Onset: 2015

 

 Generalized anxiety disorder  Scottie Morales M.D.  Onset: 2015

 

 Generalized idiopathic epilepsy and epileptic  Scottie Morales M.D.  Onset: 



 syndromes, intractable, without status    



 epilepticus    

 

 Nervous system symptoms  Scottie Morales M.D.  Onset: 2016

 

 Abrasion and/or friction burn of lower limb  Scottie Morales M.D.  Onset: 



 without infection    

 

 Open wound of scalp without complication  Scottie Morales M.D.  Onset: 2016

 

 Laceration without foreign body of other part  Scottie Morales M.D.  Onset: 2017



 of head, initial encounter    

 

 Bronchitis  Scottie Morales M.D.  Onset: 2018

 

 Candidal vulvovaginitis  Scottie Morales M.D.  Onset: 2018

 

 Chronic obstructive lung disease  Scottie Morales M.D.  Onset: 2018

 

 Pain in limb  Scottie Morales M.D.  Onset: 2019

 

 Wrist joint pain  Scottie Morales M.D.  Onset: 2019

 

 Dyspnea on exertion  Scottie Morales M.D.  Onset: 2019

 

 Dyspnea on exertion  Scottie Morales M.D.  Onset: 2020

 

 Other hyperlipidemia  Scottie Morales M.D.  Onset: 2020







Social History







 Type  Date  Description  Comments

 

 Birth Sex    Unknown  

 

 Tobacco Use  Start: Unknown End: Unknown  Former Cigarette Smoker 1 Pack  



     Daily  

 

 Smoking Status  Reviewed: 10/08/19  Former Cigarette Smoker 1 Pack  



     Daily  

 

 ETOH Use    Has consumed alcohol in the  



     past  

 

 Tobacco Use  Start: Unknown End: Unknown  Patient is a former smoker  QUIT 







Allergies, Adverse Reactions, Alerts







 Active Allergies  Reaction  Severity  Comments  Date

 

 Ampicill        1998

 

 Amoxicillin        1998

 

 Phenorone        2008

 

 Phenacemide        10/08/2019







Medications







 Active Medications  SIG  Qnty  Indications  Ordering Provider  Date

 

 Azithromycin  2 po today and 1  6tabs  J06.9  SANA León  2020



             250mg  po x 4 days        



 Tablets          



           

 

 Benzonatate  take 1 capsule by  30caps  J06.9  SWEETIE León  2020



            100mg  mouth two times        



 Capsules  daily as needed        



   for cough        

 

 Atorvastatin Calcium  take 1 tablet by  90tabs  E78.49  Scottie Morales,  2020



   mouth at bedtime      M.D.  



 10mg Tablets  for cholesterol        



           

 

 Hearing Eval  pt needs to have a    H91.09  Scottie Morales,  2020



   hearing evaluation      M.D.  



   for hearing loss        

 

 Walker With Wheels/  use both in the  1units  R29.6  Scottie Morales,  2019



 [employer]  home and out of      M.D.  



   the home dx:        



   recurrent falls        

 

 Tylenol  1 by mouth every 4  100tabs    Yovana  06/15/2017



        325mg Tablets  hours as needed      SWEETIE Le  



   pain or fever over        



   100        

 

 [employer] Vit[employer]  1 po bid      Family Medicine  10/03/2014



         [facility]  

 

 Sertraline HCL  take one tablet by  30tabs    Scottie Morales,  2013



               50mg  mouth every day      M.D.  



 Tablets  for anxiety        



           

 

 Synthroid  take one tablet by  30tabs    Scottie Morales,  2011



          75mcg  mouth every day      M.D.  



 Tablets          



           

 

 Levetiracetam  3 po bid      Unknown  



              500mg          



 Tablets          



           

 

 Imodium A-D  1 by mouth twice a  60tabs    Scottie Morales,  



            2mg  day as needed for      M.D.  



 Tablets  diarrhea        



           

 

 Vimpat  bid      Unknown  



       150mg Tablets          



           







Medications Administered in Office







 Medication  SIG  Qnty  Indications  Ordering Provider  Date

 

 TB Intradermal Test        Scottie Morales M.D.  2017



         Injection          



           







Immunizations







 CPT Code  Status  Date  Vaccine  Lot #

 

 [id number]  Given  2019  Influenza Virus Vaccine, Recombinant Dna,  [id number]



       Hemagglutnin Protein On  

 

 21936  Given  2017  Pneumococcal Immunization  [id number]

 

 66714  Given  2017  Influenza Vac, Quadrivalent, Slit Virus, Im  

 

 75349  Given  2017  Tdap Tetanus, W Pertussis  [id number]

 

 96688  Given  2015  Pneumococcal Conjugate Vacc-13  [id number]

 

 07687  Given  10/08/2014  DO Not Use Split Influenza Virus Vaccine  

 

 71251  Given  1999  DO Not Use Split Influenza Virus Vaccine  

 

 46272  Given  10/22/1998  Influenza Immunization  







Vital Signs







 Date  Vital  Result  Comment

 

 2020 11:13am  BP Systolic  118 mmHg  









 BP Diastolic  78 mmHg  

 

 Heart Rate  68 /min  

 

 Body Temperature  97.8 F  

 

 Respiratory Rate  18 /min  

 

 Weight  199.00 lb  









 2020  4:13pm  BP Systolic  118 mmHg  









 BP Diastolic  62 mmHg  

 

 Heart Rate  80 /min  

 

 Body Temperature  97.7 F  

 

 Respiratory Rate  24 /min  

 

 Height  59 inches  4'11"







Results







 Test  Acquired Date  Facility  Test  Result  H/L  Range  Note

 

 Flu A&B ([facility])  2020  family medicine  Influenza A  neg      



     (607)-   -          









 Influenza B  neg      









 Comprehensive Metabolic  2019  Teresa Sveta([facility])  Sodium  142 mEq/L    
134-149  



 Prof              









 Potassium  5.0 mEq/L    3.6-5.5  

 

 Chloride  98 mEq/L      

 

 Carbon Dioxide  28 mEq/L    21-32  

 

 Glucose  95 mg/dL      

 

 BUN  11 mg/dL    6-26  

 

 Creatinine  0.6 mg/dL    0.6-1.4  

 

 BUN/Creat Ratio  18.3 CALC    8.0-36.0  

 

 Calcium  10.2 mg/dL    8.6-10.2  

 

 Total Protein  8.0 g/dL    6.4-8.3  

 

 Albumin  4.6 g/dL    3.8-5.5  

 

 Globulin  3.4 g/dL    2.0-4.8  

 

 A/G Ratio  1.4 CALC    0.6-2.3  

 

 Alk. Phosphatase  178 U/L  High    1

 

 Alt (SGPT)  26 U/L    7-35  

 

 Ast (Sgot)  19 U/L    5-34  

 

 Total Bilirubin  0.2 mg/dL    0.2-1.3  

 

 GFR Non-African American  >60 ml/min/1.73m^    >=60  

 

 GFR African American  >60 ml/min/1.73m^    >=60  









 Laboratory test finding  2019  Teresa Sveta([facility])  TSH  0.74 mIU/L    
0.50-6.00  









 Free T4  1.14 ng/dL    0.75-1.54  









 Lipid Profile  2019  Teresa Sveta([facility])  Cholesterol  213 mg/dL  High  
120-200  









 Triglycerides  226 mg/dL  High    

 

 HDL Cholesterol  43 mg/dL    30-85  

 

 LDL (Calculated)  125 CALC    0-129  

 

 VLDL Cholesterol  45 mg/dL    0-50  

 

 HDL Risk Factor  5.0 CALC  High  0.0-4.4  









 CBC Electronic Fma  2019  Malick Sveta([facility])  WBC  6.0 x10^3/UL    4.0-
10.0  









 RBC  5.13 x10^6/UL    3.93-6.00  

 

 HGB  14.2 g/dL    12.0-17.0  

 

 HCT  44 %    35-50  

 

 MCV  86.0 fL    80.0-95.0  

 

 MCH  27.7 pg    25.6-32.2  

 

 MCHC  32.2 g/dL    32.2-36.0  

 

 RDW-CV  15.0 %  High  11.6-14.4  

 

 PLT  299 x10^3/UL    163-400  

 

 MPV  11.5 fL    9.4-12.4  

 

 Brionna#  3.71 x10^3/UL    1.56-6.13  

 

 Lymph#  1.39 x10^3/UL    1.18-3.74  

 

 Mono#  0.66 x10^3/UL    0.24-0.82  

 

 Eos #  0.2 x10^3/UL    0.0-0.5  

 

 Baso #  0.04 x10^3/UL    0.01-0.08  

 

 Brionna%  61.5 %    34.0-70.0  

 

 Lymph %  23.1 %    20.0-52.0  

 

 Mono%  10.9 %    5.0-12.0  

 

 Eos%  3.6 %    0.7-7.0  

 

 Baso%  0.7 %    0.1-1.2  









 CBC Auto Diff  10/04/2019  [facility]  White Blood Count  5.7 10^3/uL  Normal  3.5-
10.8  









 Red Blood Count  4.66 10^6/uL  Normal  3.70-4.87  

 

 Hemoglobin  13.4 g/dL  Normal  12.0-16.0  

 

 Hematocrit  40 %  Normal  35-47  

 

 Mean Corpuscular Volume  86 fL  Normal  80-97  

 

 Mean Corpuscular Hemoglobin  29 pg  Normal  27-31  

 

 Mean Corpuscular HGB Conc  34 g/dL  Normal  31-36  

 

 Red Cell Distribution Width  14 %  Normal  10-15  

 

 Platelet Count  238 10^3/uL  Normal  150-450  

 

 Mean Platelet Volume  8.0 fL  Normal  7.4-10.4  

 

 Abs Neutrophils  4.0 10^3/uL  Normal  1.5-7.7  

 

 Abs Lymphocytes  0.9 10^3/uL  Low  1.0-4.8  

 

 Abs Monocytes  0.5 10^3/uL  Normal  0-0.8  

 

 Abs Eosinophils  0.2 10^3/uL  Normal  0-0.6  

 

 Abs Basophils  0.0 10^3/uL  Normal  0-0.2  

 

 Abs Nucleated RBC  0.0 10^3/uL      

 

 Granulocyte %  71.5 %      

 

 Lymphocyte %  16.2 %      

 

 Monocyte %  8.6 %      

 

 Eosinophil %  2.9 %      

 

 Basophil %  0.8 %      

 

 Nucleated Red Blood Cells %  0.0      









 Laboratory test finding  10/04/2019  [facility]  Lactic Acid  0.9 mmol/L  Normal  0.5-
2.0  2

 

 Inr/Protime  10/04/2019  [facility]  Inr  1.06  Normal  0.82-1.09  3

 

 Comp Metabolic Panel  10/04/2019  [facility]  Sodium  135 mmol/L  Normal  135-145  









 Potassium  4.2 mmol/L  Normal  3.5-5.0  

 

 Chloride  103 mmol/L  Normal  101-111  

 

 Co2 Carbon Dioxide  28 mmol/L  Normal  22-32  

 

 Anion Gap  4 mmol/L  Normal  2-11  

 

 Glucose  106 mg/dL  High    

 

 Blood Urea Nitrogen  8 mg/dL  Normal  6-24  

 

 Creatinine  0.56 mg/dL  Normal  0.51-0.95  

 

 BUN/Creatinine Ratio  14.3  Normal  8-20  

 

 Calcium  8.8 mg/dL  Normal  8.6-10.3  

 

 Total Protein  6.6 g/dL  Normal  6.4-8.9  

 

 Albumin  3.8 g/dL  Normal  3.2-5.2  

 

 Globulin  2.8 g/dL  Normal  2-4  

 

 Albumin/Globulin Ratio  1.4  Normal  1-3  

 

 Total Bilirubin  0.40 mg/dL  Normal  0.2-1.0  

 

 Alkaline Phosphatase  185 U/L  High    

 

 Alt  22 U/L  Normal  7-52  

 

 Ast  17 U/L  Normal  13-39  

 

 Egfr Non-  108.3    >60  

 

 Egfr   131.1    >60  4









 Laboratory test finding  10/04/2019  [facility]  Magnesium  2.0 mg/dL  Normal  1.9-
2.7  









 Alcohol  < 10 mg/dL  Normal  <10  









 Urine Drug SCR ED  10/04/2019  [facility]  Urine Amphetamine  None Detected    None 
Detect  



 [facility]      Screen        









 Urine Barbiturates Screen  None Detected    None Detect  

 

 Urine Benzodiazepine Screen  None Detected    None Detect  

 

 Urine Cannabinoids Screen  None Detected    None Detect  

 

 Urine Cocaine Screen  None Detected    None Detect  

 

 Urine Opiates Screen  None Detected    None Detect  

 

 Urine Phencyclidine Screen  None Detected    None Detect  5









 Urinalysis Profile  10/04/2019  [facility]  Urine Color  Straw      









 Urine Appearance  Clear      

 

 Urine Specific Gravity  1.005  Low  1.010-1.030  

 

 Urine pH  8.0  Normal  5-9  

 

 Urine Urobilinogen  Negative    Negative  

 

 Urine Ketones  Negative    Negative  

 

 Urine Protein  Negative    Negative  

 

 Urine Leukocytes  Negative    Negative  

 

 Urine Blood  Negative    Negative  

 

 Urine Nitrite  Negative    Negative  

 

 Urine Bilirubin  Negative    Negative  

 

 Urine Glucose  Negative    Negative  









 Laboratory test  10/04/2019  [facility]  TSH (Thyroid Stim  1.13 mcIU/mL  Normal  0.34
-5.60  



 finding      Horm)        









 Levetiracetam (Keppra)  16.0 g/mL      6

 

 Lacosamide  3.3 g/mL    1.0 - 10.0  7









 1  consistent w/ previous results

 

 2  [facility] Severe Sepsis and Septic Shock Management Bundle Measure



   requires all lactic acids initially measuring >2.0 mmol/L be



   repeated.

 

 3  Standard intensity warfarin therapeutic range: 2.0-3.0



   High intensity warfarin therapeutic range: 2.5-3.5

 

 4  *******Because ethnic data is not always readily available,



   this report includes an eGFR for both -Americans and



   non- Americans.****



   The National Kidney Disease Education Program (NKDEP) does



   not endorse the use of the MDRD equation for patients that



   are not between the ages of 18 and 70, are pregnant, have



   extremes of body size, muscle mass, or nutritional status,



   or are non- or non-.



   According to the National Kidney Foundation, irrespective of



   diagnosis, the stage of the disease is based on the level of



   kidney function:



   Stage Description                      GFR(mL/min/1.73 m(2))



   1     Kidney damage with normal or decreased GFR       90



   2     Kidney damage with mild decrease in GFR          60-89



   3     Moderate decrease in GFR                         30-59



   4     Severe decrease in GFR                           15-29



   5     Kidney failure                       <15 (or dialysis)

 

 5  The urine specimen was tested at the listed cutoffs:



   Drug class                       test level



   (ng/mL)



   Amphetamines                        500



   Barbiturates                        200



   Benzodiazepine metabolites          200



   Cocaine metabolites                 150



   Cannabinoids                         50



   Opiates                             300



   Pcp                                  25



   



   Specimen was received without chain of custody. Results



   should be used for medical purposes only.

 

 6  -------------------REFERENCE VALUE--------------------------



   12.0 - 46.0



   -------------------ADDITIONAL INFORMATION-------------------



   This test was developed and its performance characteristics



   determined by [facility] in a manner consistent with CLIA



   requirements. This test has not been cleared or approved by



   the U.S. Food and Drug Administration.



   Test Performed by:



   [facility] [place] [address]



   : Anthony Handley M.D. Ph.D.; CLIA# [id number]

 

 7  -------------------ADDITIONAL INFORMATION-------------------



   This test was developed and its performance characteristics



   determined by [facility] in a manner consistent with CLIA



   requirements. This test has not been cleared or approved by



   the U.S. Food and Drug Administration.



   Test Performed by:



   [facility] [place] [address]



   : Anthony Handley M.D. Ph.D.; CLIA# [id number]







Procedures







 Date  Code  Description  Status

 

 2019  95716  Electrocardiogram Complete  Completed

 

 10/08/2019  54650  Remove Impacted Cerumen  Completed

 

 10/08/2019  07515  Remove Impacted Cerumen  Completed

 

 04/15/2019  46892016  Mammogram  Completed

 

 2017  20373811  Mammogram  Completed

 

 2016  89729241  Mammogram  Completed

 

 2015  97481190  Mammogram  Completed

 

 2015  26329102  Colonoscopy  Completed

 

 2014  42943248  Mammogram  Completed

 

 2013  32976242  Mammogram  Completed

 

 2013  449767972  Bone Mineral Density Test  Completed

 

 2010  56929485  Mammogram  Completed

 

 2009  28769167  Colonoscopy  Completed

 

 2009  04958817  Mammogram  Completed

 

 2007  46651442  Mammogram  Completed







Medical Devices







 Description

 

 No Information Available







Encounters







 Type  Date  Location  Provider  Dx  Diagnosis

 

 Office Visit  2020  [facility]  SWEETIE León  J06.9  Acute 
upper



   3:45p        respiratory



           infection,



           unspecified









 Z12.31  Encntr screen mammogram for malignant neoplasm of breast









 Office Visit  2020  2:40p  Main Office  Scottie Morales,  R06.02  
Shortness of



       M.D.    breath









 J44.9  Chronic obstructive pulmonary disease, unspecified

 

 E78.49  Other hyperlipidemia

 

 G40.319  Generalized idiopathic epilepsy, intractable, w/o stat epi

 

 R29.6  Repeated falls

 

 H91.09  Ototoxic hearing loss, unspecified ear









 Office Visit  2019  2:40p  Main Office  Scottie Morales,  F41.1  
Generalized anxiety



       M.D.    disorder









 G40.319  Generalized idiopathic epilepsy, intractable, w/o stat epi

 

 E03.8  Other specified hypothyroidism

 

 R29.6  Repeated falls

 

 G47.33  Obstructive sleep apnea (adult) (pediatric)









 Office Visit  2019  1:30p  [facility]  Yessica Hanna NP  R05  
Cough









 Z23  Encounter for immunization









 Office Visit  10/08/2019 10:45a  Main Office  Rafaela  G40.319  Generalized



       [place], Afnp-C    idiopathic



           epilepsy,



           intractable, w/o



           stat epi









 H61.23  Impacted cerumen, bilateral







Assessments







 Date  Code  Description  Provider

 

 2020  R05  Cough  Cecilia Pizarronp-C

 

 2020  R42  Dizziness and giddiness  Cecilia Pizarronp-BETTY

 

 2020  J06.9  Acute upper respiratory infection,  Cesilia Agustin, FNP



     unspecified  

 

 2020  Z12.31  Encounter for screening mammogram for  Cesilia Velez, FNP



     malignant neoplasm of breast  

 

 2020  R06.02  Dyspnea on exertion  Scottie Morales M.D.

 

 2020  J44.9  Chronic obstructive pulmonary disease,  Scottie Morales M.D.



     unspecified  

 

 2020  E78.49  Other hyperlipidemia  Scottie Morales M.D.

 

 2020  G40.319  Generalized idiopathic epilepsy and  Scottie Morales M.D.



     epileptic syndromes, int  

 

 2020  R29.6  Repeated falls  Scottie Morales M.D.

 

 2020  H91.09  Ototoxic hearing loss, unspecified ear  Scottie Morales M.D.

 

 2019  Z00.01  Adult health examination  Scottie Morales M.D.

 

 2019  G40.319  Generalized idiopathic epilepsy and  Scottie Morales M.D.



     epileptic syndromes, int  

 

 2019  R29.6  Repeated falls  Scottie Morales M.D.

 

 2019  F41.1  Generalized anxiety disorder  Scottie Moarles M.D.

 

 2019  E03.8  Other specified hypothyroidism  Scottie Morales M.D.

 

 2019  G47.33  Obstructive sleep apnea (adult)  Scottie Morales M.D.



     (pediatric)  

 

 2019  R06.02  Dyspnea on exertion  Scottie Morales M.D.

 

 2019  J44.9  Chronic obstructive pulmonary disease,  Scottie Morales M.D.



     unspecified  

 

 2019  F41.1  Generalized anxiety disorder  Scottie Morales M.D.

 

 2019  G40.319  Generalized idiopathic epilepsy and  Scottie Morales M.D.



     epileptic syndromes, int  

 

 2019  E03.8  Other specified hypothyroidism  Scottie Morales M.D.

 

 2019  R29.6  Repeated falls  Scottie Morales M.D.

 

 2019  G47.33  Obstructive sleep apnea (adult)  Scottie oMrales M.D.



     (pediatric)  

 

 2019  R05  Cough  Yessica Hanna, JOSE L

 

 2019  Z23  Encounter for immunization  Yessica Hanna, NP

 

 10/08/2019  G40.319  Generalized idiopathic epilepsy and  Victor Hugo Pizarro



     epileptic syndromes, intractable, without  



     status epilepticus  

 

 10/08/2019  H61.23  Impacted cerumen, bilateral  Victor Hugo Pizarro







Plan of Treatment

Future Appointment(s):2020  3:00 pm - Scottie Morales M.D. at Main 
[id number] - Cooper Pizarro-[id number] CoughFollow up:Followup:. (
Follow up)R42 Dizziness and giddinessAllComments:Medication Management Patient 
Understands medications she's taking?     Yes    No  Are there Barriers to 
Adherence?    Yes    No  Has the patient been asked about herbal supplements 
and therapies, and OTC  meds?      Yes    No       Care Plan1.  Patient has 
been queried about patient's goals/preferences and functional/lifestyle goals 
at relevant visits.  If relevant, describe: na2.  Treatment goals as explained 
to the patient: abovemanagement of current medical problemsroutine health 
maintenance anddisease prevention 3.  Are there barriers to meeting treatment 
goals?  Yes    No      If Yes, pleasedescribe:some communication problems 
between us , higinio and her son re routine f/u 4.  Self-Management goals as 
described to the patient:  Yes    No and her son no changes in current 
management at this timeher son will reattempt to be in touch by portal routine 
ov 2 idalia with pcp



Functional Status







 Description

 

 No Information Available







Mental Status







 Description

 

 No Information Available







Referrals







 Refer to   Reason for Referral  Status  Appt [facility] Physical  PHYSICAL THERAPY evaluate and  Scheduled  



 Therapy  treat balance and mobility.    



   please call pt to schedule an    



   appointment.    









 [address] [facility]

 

 [address]

 

 [place] [address] [phone] weight-bearing as tolerated

## 2022-01-10 ENCOUNTER — APPOINTMENT (OUTPATIENT)
Dept: ORTHOPEDIC SURGERY | Facility: CLINIC | Age: 62
End: 2022-01-10
Payer: COMMERCIAL

## 2022-01-10 PROBLEM — M17.12 UNILATERAL PRIMARY OSTEOARTHRITIS, LEFT KNEE: Chronic | Status: ACTIVE | Noted: 2021-12-13

## 2022-01-10 PROBLEM — I10 ESSENTIAL (PRIMARY) HYPERTENSION: Chronic | Status: ACTIVE | Noted: 2021-12-13

## 2022-01-10 PROCEDURE — 73562 X-RAY EXAM OF KNEE 3: CPT | Mod: LT

## 2022-01-10 PROCEDURE — 99024 POSTOP FOLLOW-UP VISIT: CPT

## 2022-01-10 NOTE — HISTORY OF PRESENT ILLNESS
[Procedure: ___] : status post [unfilled] [Clean/Dry/Intact] : clean, dry and intact [Healed] : healed [Swelling] : swollen [Neuro Intact] : an unremarkable neurological exam [Vascular Intact] : ~T peripheral vascular exam normal [Negative Gisella's] : maneuvers demonstrated a negative Gisella's sign [Xray (Date:___)] : [unfilled] Xray -  [Doing Well] : is doing well [No Sign of Infection] : is showing no signs of infection [Adequate Pain Control] : has adequate pain control [2] : the patient reports pain that is 2/10 in severity [Chills] : no chills [Constipation] : no constipation [Diarrhea] : no diarrhea [Dysuria] : no dysuria [Fever] : no fever [Nausea] : no nausea [Vomiting] : no vomiting [Erythema] : not erythematous [Discharge] : absent of discharge [Dehiscence] : not dehisced [de-identified] : s/p right TKR 12/17/21 [de-identified] : Pt is 3.5 weeks post-op. He is ambulating with a cane. He is taking aspirin for DVTP. He is in PT. He is only taking Tylenol for pain. He is wondering if he could get the COVID-19 booster.  [de-identified] : Right knee exam shows healing incision with no sign of infection. ROM 0-95 degrees. [de-identified] : 3V xray of the right knee done in the office today and reviewed by Dr. Joshua Groves demonstrates s/p implants in good positioning with no evidence of wear, loosening, or subsidence.  [de-identified] : Pt should continue taking aspirin BID for DVTP. Pt may get the COVID-19 booster. He should continue to do low impact exercises. Pt understands the importance of prophylaxis for invasive dental procedures. F/u with us in 3 weeks.

## 2022-01-10 NOTE — END OF VISIT
[FreeTextEntry3] : I, Ranjith Fuller, acted solely as a scribe for Dr. Joshua Groves on this date 01/10/2022.

## 2022-02-07 ENCOUNTER — APPOINTMENT (OUTPATIENT)
Dept: ORTHOPEDIC SURGERY | Facility: CLINIC | Age: 62
End: 2022-02-07

## 2022-02-08 ENCOUNTER — APPOINTMENT (OUTPATIENT)
Dept: ORTHOPEDIC SURGERY | Facility: CLINIC | Age: 62
End: 2022-02-08

## 2022-03-09 ENCOUNTER — APPOINTMENT (OUTPATIENT)
Dept: ORTHOPEDIC SURGERY | Facility: CLINIC | Age: 62
End: 2022-03-09
Payer: COMMERCIAL

## 2022-03-09 VITALS
BODY MASS INDEX: 33.03 KG/M2 | WEIGHT: 223 LBS | SYSTOLIC BLOOD PRESSURE: 159 MMHG | DIASTOLIC BLOOD PRESSURE: 104 MMHG | HEART RATE: 92 BPM | HEIGHT: 69 IN

## 2022-03-09 VITALS — WEIGHT: 223 LBS | BODY MASS INDEX: 33.03 KG/M2 | HEIGHT: 69 IN

## 2022-03-09 DIAGNOSIS — Z96.652 PRESENCE OF LEFT ARTIFICIAL KNEE JOINT: ICD-10-CM

## 2022-03-09 DIAGNOSIS — Z47.1 AFTERCARE FOLLOWING JOINT REPLACEMENT SURGERY: ICD-10-CM

## 2022-03-09 DIAGNOSIS — Z96.652 AFTERCARE FOLLOWING JOINT REPLACEMENT SURGERY: ICD-10-CM

## 2022-03-09 PROCEDURE — 99024 POSTOP FOLLOW-UP VISIT: CPT

## 2022-03-09 PROCEDURE — 73562 X-RAY EXAM OF KNEE 3: CPT | Mod: LT

## 2022-03-14 NOTE — HISTORY OF PRESENT ILLNESS
[Procedure: ___] : status post [unfilled] [Clean/Dry/Intact] : clean, dry and intact [Healed] : healed [Swelling] : swollen [Neuro Intact] : an unremarkable neurological exam [Vascular Intact] : ~T peripheral vascular exam normal [Negative Gisella's] : maneuvers demonstrated a negative Gisella's sign [Xray (Date:___)] : [unfilled] Xray -  [Doing Well] : is doing well [No Sign of Infection] : is showing no signs of infection [0] : no pain reported [Excellent Pain Control] : has excellent pain control [Chills] : no chills [Constipation] : no constipation [Diarrhea] : no diarrhea [Dysuria] : no dysuria [Fever] : no fever [Nausea] : no nausea [Vomiting] : no vomiting [Erythema] : not erythematous [Discharge] : absent of discharge [Dehiscence] : not dehisced [de-identified] : s/p left TKR DOS: 12/17/21. \par  [de-identified] : Pt is almost 12 weeks post-op. He denies pain and completed PT. He is ambulating without any assistive devices. He is happy that he had surgery.  [de-identified] : left knee exam shows healed incision with no sign of infection. ROM is 0-120 degrees [de-identified] : 3V xray of the left knee done in the office today and reviewed by Dr. Joshua Groves demonstrates s/p implants in good positioning with no evidence of wear, loosening, or subsidence.  [de-identified] : He should continue to do low impact exercises. Pt understands the importance of prophylaxis for invasive dental procedures. F/u with us a year from surgery.

## 2022-03-14 NOTE — END OF VISIT
[FreeTextEntry3] : I, Ranjith Fuller, acted solely as a scribe for Dr. Joshua Groves on this date 03/09/2022.

## 2022-03-14 NOTE — BEGINNING OF VISIT
[Patient] : patient [] :  [Interpreters_IDNumber] : [id number] [Interpreters_FullName] : shivam [TWNoteComboBox1] : Filipino

## 2022-05-25 LAB
ESTIMATED AVERAGE GLUCOSE: 117 MG/DL
HBA1C MFR BLD HPLC: 5.7 %

## 2023-02-13 ENCOUNTER — OFFICE (OUTPATIENT)
Dept: URBAN - METROPOLITAN AREA CLINIC 115 | Facility: CLINIC | Age: 63
Setting detail: OPHTHALMOLOGY
End: 2023-02-13
Payer: COMMERCIAL

## 2023-02-13 DIAGNOSIS — H43.813: ICD-10-CM

## 2023-02-13 DIAGNOSIS — H52.03: ICD-10-CM

## 2023-02-13 DIAGNOSIS — H52.4: ICD-10-CM

## 2023-02-13 DIAGNOSIS — H25.13: ICD-10-CM

## 2023-02-13 DIAGNOSIS — E11.3293: ICD-10-CM

## 2023-02-13 PROCEDURE — 92004 COMPRE OPH EXAM NEW PT 1/>: CPT | Performed by: OPHTHALMOLOGY

## 2023-02-13 PROCEDURE — 92202 OPSCPY EXTND ON/MAC DRAW: CPT | Performed by: OPHTHALMOLOGY

## 2023-02-13 PROCEDURE — 92015 DETERMINE REFRACTIVE STATE: CPT | Performed by: OPHTHALMOLOGY

## 2023-02-13 ASSESSMENT — REFRACTION_MANIFEST
OS_CYLINDER: -0.25
OD_SPHERE: +0.75
OD_ADD: +2.50
OD_CYLINDER: -0.50
OD_VA1: 20/20
OS_VA1: 20/30
OD_AXIS: 080
OS_SPHERE: +1.00
OS_ADD: +2.50
OS_AXIS: 105

## 2023-02-13 ASSESSMENT — REFRACTION_CURRENTRX
OS_OVR_VA: 20/
OD_OVR_VA: 20/
OS_AXIS: 087
OD_CYLINDER: -0.50
OS_VPRISM_DIRECTION: SV
OS_CYLINDER: -0.50
OS_SPHERE: +3.25
OD_VPRISM_DIRECTION: SV
OD_SPHERE: +3.00
OD_AXIS: 080

## 2023-02-13 ASSESSMENT — REFRACTION_AUTOREFRACTION
OD_AXIS: 077
OS_AXIS: 106
OS_SPHERE: +1.75
OS_CYLINDER: -0.75
OD_CYLINDER: -0.25
OD_SPHERE: +1.00

## 2023-02-13 ASSESSMENT — TONOMETRY
OD_IOP_MMHG: 16
OS_IOP_MMHG: 12

## 2023-02-13 ASSESSMENT — VISUAL ACUITY
OD_BCVA: 20/50-1
OS_BCVA: 20/30-1

## 2023-02-13 ASSESSMENT — SPHEQUIV_DERIVED
OD_SPHEQUIV: 0.5
OS_SPHEQUIV: 0.875
OS_SPHEQUIV: 1.375
OD_SPHEQUIV: 0.875

## 2023-02-13 ASSESSMENT — CONFRONTATIONAL VISUAL FIELD TEST (CVF)
OD_FINDINGS: FULL
OS_FINDINGS: FULL

## 2024-02-26 NOTE — PATIENT PROFILE ADULT - FALL HARM RISK - UNIVERSAL INTERVENTIONS
Appropriate Bed in lowest position, wheels locked, appropriate side rails in place/Call bell, personal items and telephone in reach/Instruct patient to call for assistance before getting out of bed or chair/Non-slip footwear when patient is out of bed/[place] to call system/Physically safe environment - no spills, clutter or unnecessary equipment/Purposeful Proactive Rounding/Room/bathroom lighting operational, light cord in reach

## 2025-08-04 ENCOUNTER — APPOINTMENT (OUTPATIENT)
Dept: ORTHOPEDIC SURGERY | Facility: CLINIC | Age: 65
End: 2025-08-04
Payer: COMMERCIAL

## 2025-08-04 VITALS
BODY MASS INDEX: 34.36 KG/M2 | HEART RATE: 92 BPM | DIASTOLIC BLOOD PRESSURE: 84 MMHG | SYSTOLIC BLOOD PRESSURE: 144 MMHG | HEIGHT: 69 IN | WEIGHT: 232 LBS

## 2025-08-04 DIAGNOSIS — Z47.1 AFTERCARE FOLLOWING JOINT REPLACEMENT SURGERY: ICD-10-CM

## 2025-08-04 DIAGNOSIS — Z96.652 AFTERCARE FOLLOWING JOINT REPLACEMENT SURGERY: ICD-10-CM

## 2025-08-04 DIAGNOSIS — M17.11 UNILATERAL PRIMARY OSTEOARTHRITIS, RIGHT KNEE: ICD-10-CM

## 2025-08-04 PROCEDURE — 20610 DRAIN/INJ JOINT/BURSA W/O US: CPT | Mod: RT

## 2025-08-04 PROCEDURE — 73564 X-RAY EXAM KNEE 4 OR MORE: CPT | Mod: RT

## 2025-08-04 PROCEDURE — 99204 OFFICE O/P NEW MOD 45 MIN: CPT | Mod: 25

## 2025-08-04 RX ORDER — AMLODIPINE BESYLATE 5 MG/1
5 TABLET ORAL
Refills: 0 | Status: ACTIVE | COMMUNITY

## 2025-08-04 RX ORDER — AMOXICILLIN 500 MG/1
500 TABLET, FILM COATED ORAL
Qty: 4 | Refills: 0 | Status: ACTIVE | COMMUNITY
Start: 2025-08-04 | End: 1900-01-01

## 2025-08-04 RX ORDER — GLIPIZIDE 5 MG/1
5 TABLET ORAL
Refills: 0 | Status: ACTIVE | COMMUNITY

## 2025-09-20 ENCOUNTER — NON-APPOINTMENT (OUTPATIENT)
Age: 65
End: 2025-09-20